# Patient Record
Sex: FEMALE | Race: WHITE | NOT HISPANIC OR LATINO | Employment: FULL TIME | URBAN - METROPOLITAN AREA
[De-identification: names, ages, dates, MRNs, and addresses within clinical notes are randomized per-mention and may not be internally consistent; named-entity substitution may affect disease eponyms.]

---

## 2018-06-29 ENCOUNTER — HOSPITAL ENCOUNTER (EMERGENCY)
Facility: HOSPITAL | Age: 48
Discharge: HOME | End: 2018-06-29
Attending: EMERGENCY MEDICINE | Admitting: EMERGENCY MEDICINE
Payer: COMMERCIAL

## 2018-06-29 ENCOUNTER — APPOINTMENT (EMERGENCY)
Dept: RADIOLOGY | Facility: HOSPITAL | Age: 48
End: 2018-06-29
Payer: COMMERCIAL

## 2018-06-29 VITALS
RESPIRATION RATE: 16 BRPM | SYSTOLIC BLOOD PRESSURE: 125 MMHG | WEIGHT: 149 LBS | HEART RATE: 61 BPM | TEMPERATURE: 98.3 F | OXYGEN SATURATION: 97 % | DIASTOLIC BLOOD PRESSURE: 55 MMHG

## 2018-06-29 DIAGNOSIS — M25.531 WRIST PAIN, ACUTE, RIGHT: Primary | ICD-10-CM

## 2018-06-29 PROCEDURE — 99283 EMERGENCY DEPT VISIT LOW MDM: CPT

## 2018-06-29 PROCEDURE — 73110 X-RAY EXAM OF WRIST: CPT

## 2018-06-29 RX ORDER — FLUOXETINE HYDROCHLORIDE 40 MG/1
40 CAPSULE ORAL DAILY
COMMUNITY

## 2018-06-29 RX ORDER — LEVOTHYROXINE SODIUM 0.07 MG/1
88 TABLET ORAL DAILY
COMMUNITY

## 2018-06-29 RX ORDER — ESTRADIOL 0.5 MG/1
0.5 TABLET ORAL DAILY
COMMUNITY

## 2018-06-29 NOTE — ED PROVIDER NOTES
History  Chief Complaint   Patient presents with    Wrist Injury     patient presents with foosh injury to right wrist that happened monday        History provided by:  Patient   used: No    Wrist Pain   Location:  Right wrist  Quality:  Ache  Severity:  Moderate  Onset quality:  Gradual  Duration:  5 days  Timing:  Constant  Progression:  Waxing and waning  Chronicity:  New  Context:  Fall on outstretched hand  Relieved by:  Rest  Worsened by: Movement, pressure  Ineffective treatments:  None tried  Associated symptoms: no abdominal pain, no chest pain, no cough, no diarrhea, no fatigue, no fever, no headaches, no nausea, no rash, no rhinorrhea, no shortness of breath, no sore throat and no vomiting        Prior to Admission Medications   Prescriptions Last Dose Informant Patient Reported? Taking? FLUoxetine (PROzac) 40 MG capsule   Yes Yes   Sig: Take 40 mg by mouth daily   estradiol (ESTRACE) 0 5 MG tablet   Yes Yes   Sig: Take 0 5 mg by mouth daily   levothyroxine 75 mcg tablet   Yes Yes   Sig: Take 88 mcg by mouth daily      Facility-Administered Medications: None       History reviewed  No pertinent past medical history  History reviewed  No pertinent surgical history  History reviewed  No pertinent family history  I have reviewed and agree with the history as documented  Social History   Substance Use Topics    Smoking status: Current Every Day Smoker     Packs/day: 0 50     Types: Cigarettes    Smokeless tobacco: Never Used    Alcohol use No        Review of Systems   Constitutional: Negative for activity change, appetite change, fatigue, fever and unexpected weight change  HENT: Negative for rhinorrhea, sore throat and voice change  Eyes: Negative for pain and visual disturbance  Respiratory: Negative for cough, chest tightness and shortness of breath  Cardiovascular: Negative for chest pain     Gastrointestinal: Negative for abdominal pain, diarrhea, nausea and vomiting  Endocrine: Negative for polyphagia and polyuria  Genitourinary: Negative for difficulty urinating, dysuria, flank pain, frequency and urgency  Musculoskeletal: Negative for back pain, gait problem, neck pain and neck stiffness  Skin: Negative for color change and rash  Allergic/Immunologic: Negative for immunocompromised state  Neurological: Negative for dizziness, syncope, speech difficulty, light-headedness, numbness and headaches  Hematological: Does not bruise/bleed easily  Psychiatric/Behavioral: Negative for confusion and decreased concentration  Physical Exam  Physical Exam   Constitutional: She is oriented to person, place, and time  She appears well-developed and well-nourished  HENT:   Head: Normocephalic and atraumatic  Eyes: Conjunctivae and EOM are normal  Pupils are equal, round, and reactive to light  No scleral icterus  Neck: Normal range of motion  Neck supple  No tracheal deviation present  Cardiovascular: Normal rate and regular rhythm  Pulmonary/Chest: Effort normal and breath sounds normal  No respiratory distress  Abdominal: Soft  She exhibits no distension  There is no tenderness  There is no rebound and no guarding  Musculoskeletal: Normal range of motion  She exhibits no tenderness or deformity  Right upper extremity  There is anatomical snuffbox tenderness  Range of motion is normal  Normal capillary refill and distal sensation  Patient complains of pain with dorsiflexion and abduction of the wrist against resistance  No edema or ecchymoses  Lymphadenopathy:     She has no cervical adenopathy  Neurological: She is alert and oriented to person, place, and time  No gross focal sensory or motor deficits   Skin: Skin is warm and dry  No rash noted  She is not diaphoretic  Psychiatric: She has a normal mood and affect  Vitals reviewed        Vital Signs  ED Triage Vitals   Temperature Pulse Respirations Blood Pressure SpO2 06/29/18 1016 06/29/18 1016 06/29/18 1016 06/29/18 1016 06/29/18 1016   98 3 °F (36 8 °C) 76 16 128/60 98 %      Temp src Heart Rate Source Patient Position - Orthostatic VS BP Location FiO2 (%)   -- 06/29/18 1107 06/29/18 1107 06/29/18 1107 --    Monitor Sitting Left arm       Pain Score       06/29/18 1016       7           Vitals:    06/29/18 1016 06/29/18 1107   BP: 128/60 125/55   Pulse: 76 61   Patient Position - Orthostatic VS:  Sitting       Visual Acuity      ED Medications  Medications - No data to display    Diagnostic Studies  Results Reviewed     None                 XR wrist 3+ views RIGHT   Final Result by Ashish Ugarte DO (06/29 1136)   No acute osseous abnormality  Workstation performed: [id number]                    Procedures  Procedures       Phone Contacts  ED Phone Contact    ED Course                               MDM  Number of Diagnoses or Management Options  Wrist pain, acute, right: new and requires workup  Diagnosis management comments: 49-year-old female presented for evaluation of right-sided wrist pain  Patient reports a fall on an outstretched hand that occurred five days prior to presentation  She has had persistent discomfort in the lateral aspect of her right wrist   This is worse with movement or with weight-bearing, direct palpation  Denies any numbness or weakness  No head trauma or loss of consciousness  No trauma to other extremities  Plain radiographs are negative for to dislocation does difficult tenderness over the right scaphoid bone  Will treat for possible occult scaphoid fracture the pedis  Discussed reasons to return to the emergency department         Amount and/or Complexity of Data Reviewed  Tests in the radiology section of CPT®: reviewed and ordered  Review and summarize past medical records: yes  Independent visualization of images, tracings, or specimens: yes      CritCare Time    Disposition  Final diagnoses:   Wrist pain, acute, right Time reflects when diagnosis was documented in both MDM as applicable and the Disposition within this note     Time User Action Codes Description Comment    6/29/2018 11:49 AM Vanessalai Nolan Add [N97 [id number]] Wrist pain, acute, right       ED Disposition     ED Disposition Condition Comment    Discharge  Travon Carlson discharge to home/self care  Condition at discharge: Good        Follow-up Information     Follow up With Specialties Details Why [address] [facility]  Please call to arrange for follow-up in 1 to 2 weeks  If you have new or worsening symptoms please call your doctor or return to the emergency department  [address] [facility] [address]  [phone]          Patient's Medications   Discharge Prescriptions    No medications on file     No discharge procedures on file      ED Provider  Electronically Signed by           Kristi Auguste MD  06/29/18 0777

## 2018-06-29 NOTE — DISCHARGE INSTRUCTIONS
Wrist Injury   WHAT YOU NEED TO KNOW:   A wrist injury happens when the tissues of your wrist joint are damaged  Your wrist joint is made up of tendons, ligaments, nerves, and bones  Two common types of injuries that can happen to your wrist are sprains and strains  A sprain can happen when the ligaments are stretched or torn  Ligaments are bands of elastic tissue that connect and hold the bones together  A strain happens when a tendon or muscle is overused, stretched, or torn  Tendons attach your hand and arm muscles to the bones of the wrist    DISCHARGE INSTRUCTIONS:   Medicines:   · NSAIDs:  These medicines decrease swelling, pain, and fever  NSAIDs are available without a doctor's order  Ask which medicine is right for you  Ask how much to take and when to take it  Take as directed  NSAIDs can cause stomach bleeding and kidney problems if not taken correctly  · Pain medicine: You may be given a prescription medicine to decrease pain  Do not wait until the pain is severe before you take this medicine  · Take your medicine as directed  Contact your healthcare provider if you think your medicine is not helping or if you have side effects  Tell him of her if you are allergic to any medicine  Keep a list of the medicines, vitamins, and herbs you take  Include the amounts, and when and why you take them  Bring the list or the pill bottles to follow-up visits  Carry your medicine list with you in case of an emergency  Follow up with your healthcare provider as directed:  Write down your questions so you remember to ask them during your visits  Manage your symptoms:   · Wrist supports:  A cast or splint may be put on your fingers, hand, and wrist to support your wrist and prevent further damage  Wear these as directed  Ask for instructions on how to bathe while you are wearing a splint or case  · Rest:  You may need to rest your wrist for at least 48 hours and avoid activities that cause pain   Ask what activities you should avoid and for how long  · Ice:  Ice helps decrease swelling and pain  Ice may also help prevent tissue damage  Use an ice pack or put crushed ice in a plastic bag  Cover it with a towel and place it on your injured wrist for 15 to 20 minutes every hour as directed  · Compression:  Your healthcare provider may suggest you wrap your wrist with an elastic bandage  This will help decrease swelling, support your wrist, and help it heal  Wear your wrist wrap as directed  Ask for instructions on how to wrap your wrist     · Elevation:  When you sit or lie down, keep your wrist at or above the level of your heart  This may help decrease pain and swelling  Physical therapy:  Your healthcare provider may recommend that you go to physical therapy  A physical therapist shows you how to do exercises that can help to strengthen your wrist and improve its range of movement  These exercises may also help decrease your pain  Prevent another wrist injury:   · Do strengthening exercises: Your healthcare provider or physical therapist may suggest that you do exercises to strengthen your hand and arm muscles  Ask when you may return to your regular physical activities or sports  If you start to exercise too soon it may cause you to injure your wrist again  · Protect your wrists:  Wrist guard splints or protective tape can help to support your wrist during exercise and sports  These devices may also keep your wrist from bending too far back  Ask for more information about the type of wrist support that you should use  Contact your healthcare provider if:   · You have a fever  · The bruising, swelling, or pain in your wrist gets worse  · You have questions or concerns about your condition or care  Return to the emergency department if:   · The skin on or near your wrist or hand feels cold, or it turns blue or white      · The skin on or near your wrist or hand is very tight, raised, and swollen  · You have new trouble moving and using your hands, fingers, or wrist     · Your wrist, hands, or fingers become swollen, red, numb, or they tingle  · Your wrist has any open wounds, including from surgery, that are red, swollen, warm, or have pus coming from them  © 2017 [employer] Information is for End User's use only and may not be sold, redistributed or otherwise used for commercial purposes  All illustrations and images included in CareNotes® are the copyrighted property of A D A M [employer] Inc  or Gregory Cavanaugh  The above information is an  only  It is not intended as medical advice for individual conditions or treatments  Talk to your doctor, nurse or pharmacist before following any medical regimen to see if it is safe and effective for you

## 2018-07-03 ENCOUNTER — OFFICE VISIT (OUTPATIENT)
Dept: OBGYN CLINIC | Facility: CLINIC | Age: 48
End: 2018-07-03
Payer: COMMERCIAL

## 2018-07-03 VITALS
BODY MASS INDEX: 27.68 KG/M2 | RESPIRATION RATE: 12 BRPM | WEIGHT: 150.4 LBS | SYSTOLIC BLOOD PRESSURE: 110 MMHG | HEART RATE: 74 BPM | DIASTOLIC BLOOD PRESSURE: 78 MMHG | HEIGHT: 62 IN

## 2018-07-03 DIAGNOSIS — M25.531 RIGHT WRIST PAIN: Primary | ICD-10-CM

## 2018-07-03 PROCEDURE — 99203 OFFICE O/P NEW LOW 30 MIN: CPT | Performed by: FAMILY MEDICINE

## 2018-07-03 NOTE — LETTER
July 3, 2018     Patient: Markos Moreno   YOB: 1970   Date of Visit: 7/3/2018       To Whom it May Concern:    Markos Moreno is under my professional care  She was seen in my office on 7/3/2018  She is unable to return to work at this time  She is being sent for CT scan of the wrist and will be re-evaluated afterward  If you have any questions or concerns, please don't hesitate to call           Sincerely,          [employer], DO        CC: No Recipients

## 2018-07-03 NOTE — PROGRESS NOTES
Assessment/Plan:  Assessment/Plan   Diagnoses and all orders for this visit:    Right wrist pain  -     CT wrist right wo contrast; Future  -     Brace         31-year-old female with right wrist pain  Discussed with patient physical exam, radiographs, impression, and plan  X-rays are unremarkable for acute osseous abnormality  Her physical exam is remarkable for significant tenderness upon palpation of the scaphoid and significant pain with active and passive range of motion of the thumb  Given mechanism of injury of falling on outstretched hand and significant tenderness of the scaphoid there is high clinical suspicion for occult fracture  I will refer her for CT scan of the right wrist and she will follow up with me after getting this done  I will place her in thumb spica brace which she is to wear at all times except for hygiene  She is to refrain from any heavy lifting, pulling, or pushing in the right upper extremity  Subjective:   Patient ID: Luis Villalba is a 52 y o  female  Chief Complaint   Patient presents with    Right Wrist - Pain, Fracture         31-year-old right-hand-dominant female presents for evaluation of right wrist pain following fall injury at home 1 week ago  While walking up stairs she fell forward on her outstretched hand  She had pain immediately that was described as localized to radial aspect of the wrist, sharp, radiating proximally of the forearm, worse with movement of the wrist and thumb, and associated swelling and bruising  Over the next few days she self-treated with ice and taking ibuprofen  Pain worsened so she presented to the emergency room where x-ray evaluation was unremarkable for gross osseous abnormality but was clinical suspicion for occult osseous injury  She was placed in wrist splint and advised to follow up with orthopedic care  Today she reports improved swelling however still has pain at the wrist with movement of the wrist and thumb    She denies any numbness/tingling  She denies any previous injury to the wrist       Wrist Pain   This is a new problem  The current episode started in the past 7 days  The problem occurs constantly  The problem has been unchanged  Associated symptoms include arthralgias and joint swelling  Pertinent negatives include no chest pain, chills, fever, numbness, rash, sore throat or weakness  Exacerbated by: Wrist and thumb use  She has tried rest, immobilization and ice for the symptoms  The treatment provided mild relief  The following portions of the patient's history were reviewed and updated as appropriate: She  has a past medical history of Anxiety; Depression; Disease of thyroid gland; and Endometriosis  She  has a past surgical history that includes Hysterectomy and Breast mass excision  Her family history includes Anuerysm in her mother; Diabetes in her father; Irving Officer' disease in her brother; Heart disease in her brother, father, and mother; No Known Problems in her sister; Seizures in her brother  She  reports that she has been smoking Cigarettes  She has been smoking about 0 50 packs per day  She has never used smokeless tobacco  She reports that she does not drink alcohol or use drugs  She has No Known Allergies       Review of Systems   Constitutional: Negative for chills and fever  HENT: Negative for sore throat  Eyes: Negative for visual disturbance  Respiratory: Negative for shortness of breath  Cardiovascular: Negative for chest pain  Gastrointestinal: Negative for abdominal distention  Genitourinary: Negative for flank pain  Musculoskeletal: Positive for arthralgias and joint swelling  Skin: Negative for rash and wound  Neurological: Negative for weakness and numbness  Hematological: Does not bruise/bleed easily  Psychiatric/Behavioral: Negative for self-injury         Objective:  Vitals:    07/03/18 1346   BP: 110/78   BP Location: Left arm   Patient Position: Sitting   Cuff Size: Standard   Pulse: 74   Resp: 12   Weight: 68 2 kg (150 lb 6 4 oz)   Height: 5' 2" (1 575 m)     Right Hand Exam     Muscle Strength   Wrist Extension: 4+/5   Wrist Flexion: 4+/5       Right Elbow Exam     Tenderness   The patient is experiencing no tenderness  Range of Motion   The patient has normal right elbow ROM  Muscle Strength   The patient has normal right elbow strength  Observations     Right Wrist/Hand   Negative for deformity  Tenderness     Right Wrist/Hand   Tenderness in the carpometacarpal joint and scaphoid  No tenderness in the first dorsal compartment and triangular fibrocartilage complex   Active Range of Motion     Right Wrist   Wrist flexion: 20 degrees with pain  Wrist extension: 30 degrees with pain    Additional Active Range of Motion Details  Right  - normal range of motion of 2nd through 5th digits  - limited range of motion of thumb at [facility] due to pain    Strength/Myotome Testing     Right Wrist/Hand   Wrist extension: 4+  Wrist flexion: 4+     (2nd hand position)     Trial 1: 5    Tests     Right Wrist/Hand   Negative TFCC load  Physical Exam   Constitutional: She is oriented to person, place, and time  She appears well-developed  No distress  HENT:   Head: Normocephalic  Eyes: Conjunctivae are normal    Neck: No tracheal deviation present  Cardiovascular: Normal rate  Pulmonary/Chest: Effort normal  No respiratory distress  Abdominal: She exhibits no distension  Musculoskeletal:        Right hand: She exhibits no deformity  Neurological: She is alert and oriented to person, place, and time  Skin: Skin is warm and dry  Psychiatric: She has a normal mood and affect  Her behavior is normal    Nursing note and vitals reviewed        I have personally reviewed pertinent films in PACS and my interpretation is No acute osseous abnormality of right wrist

## 2018-07-10 ENCOUNTER — HOSPITAL ENCOUNTER (OUTPATIENT)
Dept: CT IMAGING | Facility: HOSPITAL | Age: 48
Discharge: HOME | End: 2018-07-10
Payer: COMMERCIAL

## 2018-07-10 DIAGNOSIS — M25.531 RIGHT WRIST PAIN: ICD-10-CM

## 2018-07-10 PROCEDURE — 73200 CT UPPER EXTREMITY W/O DYE: CPT

## 2018-07-12 ENCOUNTER — OFFICE VISIT (OUTPATIENT)
Dept: OBGYN CLINIC | Facility: CLINIC | Age: 48
End: 2018-07-12
Payer: COMMERCIAL

## 2018-07-12 VITALS
HEIGHT: 62 IN | SYSTOLIC BLOOD PRESSURE: 122 MMHG | HEART RATE: 68 BPM | WEIGHT: 153 LBS | BODY MASS INDEX: 28.16 KG/M2 | DIASTOLIC BLOOD PRESSURE: 84 MMHG

## 2018-07-12 DIAGNOSIS — S69.91XD WRIST INJURY, RIGHT, SUBSEQUENT ENCOUNTER: ICD-10-CM

## 2018-07-12 DIAGNOSIS — M65.4 DE QUERVAIN'S TENOSYNOVITIS, RIGHT: Primary | ICD-10-CM

## 2018-07-12 DIAGNOSIS — M25.531 PAIN IN RIGHT WRIST: ICD-10-CM

## 2018-07-12 PROCEDURE — 20550 NJX 1 TENDON SHEATH/LIGAMENT: CPT | Performed by: FAMILY MEDICINE

## 2018-07-12 PROCEDURE — 99213 OFFICE O/P EST LOW 20 MIN: CPT | Performed by: FAMILY MEDICINE

## 2018-07-12 RX ORDER — LIDOCAINE HYDROCHLORIDE 10 MG/ML
0.5 INJECTION, SOLUTION INFILTRATION; PERINEURAL
Status: COMPLETED | OUTPATIENT
Start: 2018-07-12 | End: 2018-07-12

## 2018-07-12 RX ORDER — NAPROXEN 500 MG/1
500 TABLET ORAL 2 TIMES DAILY WITH MEALS
Qty: 30 TABLET | Refills: 0 | Status: SHIPPED | OUTPATIENT
Start: 2018-07-12 | End: 2018-07-29 | Stop reason: SDUPTHER

## 2018-07-12 RX ORDER — TRIAMCINOLONE ACETONIDE 40 MG/ML
20 INJECTION, SUSPENSION INTRA-ARTICULAR; INTRAMUSCULAR
Status: COMPLETED | OUTPATIENT
Start: 2018-07-12 | End: 2018-07-12

## 2018-07-12 RX ADMIN — TRIAMCINOLONE ACETONIDE 20 MG: 40 INJECTION, SUSPENSION INTRA-ARTICULAR; INTRAMUSCULAR at 12:38

## 2018-07-12 RX ADMIN — LIDOCAINE HYDROCHLORIDE 0.5 ML: 10 INJECTION, SOLUTION INFILTRATION; PERINEURAL at 12:38

## 2018-07-12 NOTE — LETTER
July 12, 2018     Patient: Ximena Negron   YOB: 1970   Date of Visit: 7/12/2018       To Whom it May Concern:    Ximena Negron is under my professional care  She was seen in my office on 7/12/2018  She is unable to return to work at this time  She will be re-evaluated in 3 weeks  If you have any questions or concerns, please don't hesitate to call           Sincerely,          [employer], DO        CC: No Recipients

## 2018-07-12 NOTE — PROGRESS NOTES
Assessment/Plan:  Assessment/Plan   Diagnoses and all orders for this visit:    De Quervain's tenosynovitis, right  -     Hand/upper extremity injection  -     naproxen (NAPROSYN) 500 mg tablet; Take 1 tablet (500 mg total) by mouth 2 (two) times a day with meals    Wrist injury, right, subsequent encounter    Pain in right wrist  -     Hand/upper extremity injection  -     naproxen (NAPROSYN) 500 mg tablet; Take 1 tablet (500 mg total) by mouth 2 (two) times a day with meals  -     diclofenac sodium (VOLTAREN) 1 %; Apply 2 g topically 4 (four) times a day    Other orders  -     Cancel: Medium joint arthrocentesis        44-year-old right-hand-dominant female with right wrist pain  Discussed with patient physical exam, CT scan, impression, and plan  CT of wrist is unremarkable for acute osseous abnormality of the wrist  Physical exam is still noted for tenderness at the distal aspect of the radius, snuffbox, and 1st dorsal compartment, and pain with Finkelstein's  Clinical impression is contusion of the wrist with de Quervain tenosynovitis  I discussed treatment regimen of oral anti-inflammatory and corticosteroid injection to which she agreed  I administered mixture of 0 5 cc 1% lidocaine and 0 5 cc Kenalog to the 1st dorsal compartment without complication  She is to continue wearing thumb spica brace  She is to take naproxen 500 mg twice daily with food consistently for 2 weeks  She will follow up with me in 3 weeks at which point she will be re-evaluated  Subjective:   Patient ID: Cassidy Person is a 52 y o  female  Chief Complaint   Patient presents with    Right Wrist - Follow-up         44-year-old right-hand-dominant female following up for right wrist pain from fall injury 2 weeks ago  She was last seen 9 days ago at which point she was noted to have significant tenderness upon palpation of the scaphoid and was sent for CT of the wrist to evaluate for occult osseous injury    She has been wearing thumb spica brace at all times except for hygiene purposes  She still reports having significant pain at the radial aspect of the wrist that is described as constant, achy and sometimes sharp, nonradiating, worse with movement of the thumb and wrist, and improved with immobilization  She has not had any new injury since her last visit  Wrist Pain   This is a new problem  The current episode started 1 to 4 weeks ago  The problem occurs constantly  The problem has been unchanged  Associated symptoms include arthralgias  Pertinent negatives include no joint swelling, numbness or weakness  Exacerbated by: Wrist and thumb movement  She has tried immobilization for the symptoms  The treatment provided mild relief  Review of Systems   Musculoskeletal: Positive for arthralgias  Negative for joint swelling  Neurological: Negative for weakness and numbness  Objective:  Vitals:    07/12/18 1112   BP: 122/84   Pulse: 68   Weight: 69 4 kg (153 lb)   Height: 5' 2" (1 575 m)     Right Hand Exam     Muscle Strength   Wrist Extension: 4+/5   Wrist Flexion: 4+/5     Tests   Finkelstein: positive          Observations     Right Wrist/Hand   Negative for deformity  Tenderness     Right Wrist/Hand   Tenderness in the first dorsal compartment and scaphoid  No tenderness in the carpometacarpal joint  Active Range of Motion     Right Wrist   Wrist flexion: 50 degrees with pain  Wrist extension: 60 degrees with pain  Ulnar deviation: 10 degrees with pain    Additional Active Range of Motion Details  Right  -normal range of motion of 2nd through 5th digits  -limited range of motion of thumb at the MCP joint due to pain    Strength/Myotome Testing     Right Wrist/Hand   Wrist extension: 4+  Wrist flexion: 4+     (2nd hand position)     Trial 1: 5    Tests     Right Wrist/Hand   Positive Finkelstein's  Physical Exam   Constitutional: She is oriented to person, place, and time   She appears well-developed  No distress  HENT:   Head: Normocephalic  Eyes: Conjunctivae are normal    Neck: No tracheal deviation present  Cardiovascular: Normal rate  Pulmonary/Chest: Effort normal  No respiratory distress  Abdominal: She exhibits no distension  Musculoskeletal:        Right hand: She exhibits no deformity  Neurological: She is alert and oriented to person, place, and time  Skin: Skin is warm and dry  Psychiatric: She has a normal mood and affect  Her behavior is normal    Nursing note and vitals reviewed        I have personally reviewed pertinent films in PACS and my interpretation is No acute osseous abnormality of right wrist     Hand/upper extremity injection  Date/Time: 7/12/2018 12:38 PM  Consent given by: patient  Site marked: site marked  Timeout: Immediately prior to procedure a time out was called to verify the correct patient, procedure, equipment, support staff and site/side marked as required   Supporting Documentation  Indications: tendon swelling   Procedure Details  Condition:de Quervain's tenosynovitis Site: R extensor compartment 1   Preparation: Patient was prepped and draped in the usual sterile fashion  Needle size: 25 G  Ultrasound guidance: no  Approach: radial  Medications administered: 0 5 mL lidocaine 1 %; 20 mg triamcinolone acetonide 40 mg/mL  Patient tolerance: patient tolerated the procedure well with no immediate complications  Dressing:  Sterile dressing applied

## 2018-07-29 DIAGNOSIS — M65.4 DE QUERVAIN'S TENOSYNOVITIS, RIGHT: ICD-10-CM

## 2018-07-29 DIAGNOSIS — M25.531 PAIN IN RIGHT WRIST: ICD-10-CM

## 2018-07-30 RX ORDER — NAPROXEN 500 MG/1
TABLET ORAL
Qty: 30 TABLET | Refills: 0 | Status: SHIPPED | OUTPATIENT
Start: 2018-07-30 | End: 2018-08-08

## 2018-08-08 ENCOUNTER — OFFICE VISIT (OUTPATIENT)
Dept: OBGYN CLINIC | Facility: CLINIC | Age: 48
End: 2018-08-08
Payer: COMMERCIAL

## 2018-08-08 VITALS — WEIGHT: 154.6 LBS | RESPIRATION RATE: 16 BRPM | BODY MASS INDEX: 28.45 KG/M2 | HEIGHT: 62 IN

## 2018-08-08 DIAGNOSIS — M19.031 ARTHRITIS OF RIGHT WRIST: ICD-10-CM

## 2018-08-08 DIAGNOSIS — M25.531 PAIN IN RIGHT WRIST: Primary | ICD-10-CM

## 2018-08-08 PROCEDURE — 99214 OFFICE O/P EST MOD 30 MIN: CPT | Performed by: FAMILY MEDICINE

## 2018-08-08 RX ORDER — MELOXICAM 15 MG/1
15 TABLET ORAL DAILY
Qty: 30 TABLET | Refills: 1 | Status: SHIPPED | OUTPATIENT
Start: 2018-08-08 | End: 2019-06-24

## 2018-08-08 RX ORDER — TRAMADOL HYDROCHLORIDE 50 MG/1
50 TABLET ORAL EVERY 8 HOURS PRN
Qty: 30 TABLET | Refills: 0 | Status: SHIPPED | OUTPATIENT
Start: 2018-08-08 | End: 2018-10-29

## 2018-08-08 RX ORDER — VARENICLINE TARTRATE 25 MG
KIT ORAL 2 TIMES DAILY
COMMUNITY
End: 2018-10-01

## 2018-08-08 NOTE — LETTER
August 8, 2018     Patient: Adonis Maloney   YOB: 1970   Date of Visit: 8/8/2018       To Whom it May Concern:    Adonis Maloney is under my professional care  She was seen in my office on 8/8/2018  She is unable to return to work at this time, for 2 weeks  She is being sent for MRI and will be re-evaluated  If you have any questions or concerns, please don't hesitate to call           Sincerely,          [employer], DO        CC: No Recipients

## 2018-08-08 NOTE — PROGRESS NOTES
Assessment/Plan:  Assessment/Plan   Diagnoses and all orders for this visit:    Pain in right wrist  -     traMADol (ULTRAM) 50 mg tablet; Take 1 tablet (50 mg total) by mouth every 8 (eight) hours as needed for moderate pain  -     MRI wrist right wo contrast; Future    Arthritis of right wrist  -     meloxicam (MOBIC) 15 mg tablet; Take 1 tablet (15 mg total) by mouth daily  -     MRI wrist right wo contrast; Future    Other orders  -     varenicline (CHANTIX ASHLEE) 0 5 MG X 11 & 1 MG X 42 tablet; Take by mouth 2 (two) times a day Take one 0 5mg tab by mouth 1x daily for 3 days, then increase to one 0 5mg tab 2x daily for 3 days, then increase to one 1mg tab 2x daily    77-year-old right-hand-dominant female with persistent right wrist pain after fall injury  Discussed with patient physical exam, impression, and plan  Physical exam is still noted for tenderness of the scaphoid and carpometacarpal joint of the 2nd through 3rd digits  She has not improved after corticosteroid injection,  Wrist brace, and oral NSAIDs  I will refer her for MRI of the right wrist to evaluate for occult osseous and soft tissue abnormality  She is to continue with wearing thumb spica brace  She will follow up with me after getting MRI done  Subjective:   Patient ID: Ximena Negron is a 52 y o  female  Chief Complaint   Patient presents with    Right Wrist - Follow-up         77-year-old right-hand-dominant female following up for right wrist pain after fall injury  She was last seen 4 weeks ago at which point she received corticosteroid injection for treatment de Quervain tenosynovitis, was to continue with thumb spica brace, and prescribed naproxen  Today she reports she has not had improvement in pain  Pain described as localized to the wrist and base of the thumb, sharp, worse with twisting move the thumb, and improved with neutral position  She has also been using topical Voltaren gel    She denies any new injury since last visit  Wrist Pain   This is a new problem  The current episode started more than 1 month ago  The problem occurs constantly  The problem has been unchanged  Associated symptoms include arthralgias and joint swelling  Pertinent negatives include no numbness or weakness  Exacerbated by: Wrist use  She has tried rest, NSAIDs and immobilization (Corticosteroid injection) for the symptoms  The treatment provided no relief  Review of Systems   Musculoskeletal: Positive for arthralgias and joint swelling  Neurological: Negative for weakness and numbness  Objective:  Vitals:    08/08/18 1423   Resp: 16   Weight: 70 1 kg (154 lb 9 6 oz)   Height: 5' 2" (1 575 m)     Right Hand Exam     Muscle Strength   The patient has normal right wrist strength  Observations     Right Wrist/Hand   Negative for deformity  Tenderness     Right Wrist/Hand   Tenderness in the first dorsal compartment, carpometacarpal joint and scaphoid  Additional Tenderness Details   Right  -base of 2nd and 3rd metacarpals    Active Range of Motion     Right Wrist   Normal active range of motion  Wrist flexion: with pain  Wrist extension: with pain  Radial deviation: with pain  Ulnar deviation: with pain    Additional Active Range of Motion Details   Right   - normal range of motion of fingers    Strength/Myotome Testing     Right Wrist/Hand   Normal wrist strength      Physical Exam   Constitutional: She is oriented to person, place, and time  She appears well-developed and well-nourished  No distress  HENT:   Head: Normocephalic  Eyes: Conjunctivae are normal    Neck: No tracheal deviation present  Cardiovascular: Normal rate  Pulmonary/Chest: Effort normal  No respiratory distress  Abdominal: She exhibits no distension  Musculoskeletal: Normal range of motion  She exhibits tenderness  She exhibits no edema or deformity  Right hand: She exhibits no deformity     Neurological: She is alert and oriented to person, place, and time  She exhibits normal muscle tone  Coordination normal    Skin: Skin is warm and dry  No rash noted  She is not diaphoretic  No erythema  No pallor  Psychiatric: She has a normal mood and affect  Her behavior is normal  Judgment and thought content normal    Nursing note and vitals reviewed

## 2018-08-08 NOTE — LETTER
August 8, 2018     Patient: Jah Escamilal   YOB: 1970   Date of Visit: 8/8/2018       To Whom it May Concern:    Jah Escamilla is under my professional care  She was seen in my office on 8/8/2018  She is unable to return to work at this time  She is being sent for MRI  If you have any questions or concerns, please don't hesitate to call           Sincerely,          [employer], DO        CC: No Recipients

## 2018-08-20 ENCOUNTER — HOSPITAL ENCOUNTER (OUTPATIENT)
Dept: MRI IMAGING | Facility: HOSPITAL | Age: 48
Discharge: HOME | End: 2018-08-20
Payer: COMMERCIAL

## 2018-08-20 DIAGNOSIS — M25.531 PAIN IN RIGHT WRIST: ICD-10-CM

## 2018-08-20 DIAGNOSIS — M19.031 ARTHRITIS OF RIGHT WRIST: ICD-10-CM

## 2018-08-20 PROCEDURE — 73221 MRI JOINT UPR EXTREM W/O DYE: CPT

## 2018-08-23 ENCOUNTER — OFFICE VISIT (OUTPATIENT)
Dept: OBGYN CLINIC | Facility: CLINIC | Age: 48
End: 2018-08-23
Payer: COMMERCIAL

## 2018-08-23 VITALS
WEIGHT: 155 LBS | DIASTOLIC BLOOD PRESSURE: 87 MMHG | SYSTOLIC BLOOD PRESSURE: 131 MMHG | HEART RATE: 73 BPM | BODY MASS INDEX: 28.52 KG/M2 | HEIGHT: 62 IN

## 2018-08-23 DIAGNOSIS — M18.11 ARTHRITIS OF CARPOMETACARPAL (CMC) JOINT OF RIGHT THUMB: ICD-10-CM

## 2018-08-23 DIAGNOSIS — M19.031 ARTHRITIS OF RIGHT WRIST: ICD-10-CM

## 2018-08-23 DIAGNOSIS — S63.501D SPRAIN OF RIGHT WRIST, SUBSEQUENT ENCOUNTER: Primary | ICD-10-CM

## 2018-08-23 PROCEDURE — 99213 OFFICE O/P EST LOW 20 MIN: CPT | Performed by: FAMILY MEDICINE

## 2018-08-23 NOTE — LETTER
August 23, 2018     Patient: Vonna Lanes   YOB: 1970   Date of Visit: 8/23/2018       To Whom it May Concern:    Vonna Lanes is under my professional care  She was seen in my office on 8/23/2018  She is unable to return to work at this time  She will be re-evaluated in 2 weeks  If you have any questions or concerns, please don't hesitate to call           Sincerely,          [employer], DO        CC: No Recipients

## 2018-08-23 NOTE — PROGRESS NOTES
Assessment/Plan:  Assessment/Plan   Diagnoses and all orders for this visit:    Sprain of right wrist, subsequent encounter    Arthritis of carpometacarpal [place]) joint of right thumb    Arthritis of right wrist        69-year-old female with right wrist pain after fall injury at home on 06/25/2018  Discussed with patient physical exam, MRI findings, impression, and plan  MRI is noted for chondromalacia in the proximal radial aspect of the lunate bone, cystic changes seen in distal aspect the trapezius bone, negative for fracture of the scaphoid  Physical exam is noted for tenderness of the thumb CMC joint, and radiocarpal and ulnocarpal joints, with reproduction of pain at the radiocarpal joint with resisted wrist extension and resisted wrist flexion, and reproducible CMC joint of the thumb pain with resisted abduction, abduction, flexion, and extension  I discussed with patient that she has had underlying arthritis of the wrist and fall injury caused exacerbation  She is to continue with taking meloxicam 15 mg once daily, Voltaren gel, and wearing thumb spica brace intermittently as needed  She will follow up with me in 2 weeks at which point she will be re-evaluated and possibly perform corticosteroid injection  Subjective:   Patient ID: Ava Musa is a 52 y o  female  Chief Complaint   Patient presents with    Right Wrist - Pain, Follow-up      69-year-old right-hand-dominant female following up for right wrist pain sustained from fall injury at home on 06/25/2018  She was last seen 2 weeks ago at which point she was referred for MRI of the wrist, as she had not improved after receiving corticosteroid injection for treatment of de Quervain tenosynovitis  Today she reports she has been taking meloxicam 15 mg once daily and using topical Voltaren gel twice a day    She reports improvement and pain but still has pain that is described as localized to the dorsal radial aspect of the wrist and base of the thumb, constant, achy, intermittently sharp, worse with gripping activities, and improved with immobilization in thumb spica brace  She has not had any new injury since last visit  Wrist Pain   This is a new problem  The current episode started more than 1 month ago  The problem occurs constantly  The problem has been gradually improving  Associated symptoms include arthralgias and joint swelling  Pertinent negatives include no numbness or weakness  The symptoms are aggravated by twisting (Gripping)  She has tried rest, immobilization and NSAIDs for the symptoms  The treatment provided mild relief  Review of Systems   Musculoskeletal: Positive for arthralgias and joint swelling  Neurological: Negative for weakness and numbness  Objective:  Vitals:    08/23/18 1058   BP: 131/87   Pulse: 73   Weight: 70 3 kg (155 lb)   Height: 5' 2" (1 575 m)     Right Hand Exam     Muscle Strength   Wrist Extension: 4+/5   Wrist Flexion: 4+/5           Observations     Right Wrist/Hand   Negative for deformity  Tenderness     Right Wrist/Hand   Tenderness in the first dorsal compartment and carpometacarpal joint  Additional Tenderness Details  Right wrist  -radiocarpal joint  -ulnocarpal joint    Active Range of Motion     Right Wrist   Wrist flexion: 15 degrees with pain  Wrist extension: 60 degrees with pain    Additional Active Range of Motion Details   Right hand   - normal range of motion of fingers    Strength/Myotome Testing     Right Wrist/Hand   Wrist extension: 4+  Wrist flexion: 4+    Tests     Right Wrist/Hand   Positive CMC grind  Physical Exam   Constitutional: She is oriented to person, place, and time  She appears well-developed  No distress  HENT:   Head: Normocephalic  Eyes: Conjunctivae are normal    Neck: No tracheal deviation present  Cardiovascular: Normal rate  Pulmonary/Chest: Effort normal  No respiratory distress  Abdominal: She exhibits no distension  Musculoskeletal:        Right hand: She exhibits no deformity  Neurological: She is alert and oriented to person, place, and time  Skin: Skin is warm and dry  Psychiatric: She has a normal mood and affect  Her behavior is normal    Nursing note and vitals reviewed  I have personally reviewed pertinent films in PACS and my interpretation is Right wrist CMC joint of the thumb and radiocarpal arthritis

## 2018-09-10 ENCOUNTER — OFFICE VISIT (OUTPATIENT)
Dept: OBGYN CLINIC | Facility: CLINIC | Age: 48
End: 2018-09-10
Payer: COMMERCIAL

## 2018-09-10 VITALS
WEIGHT: 158 LBS | SYSTOLIC BLOOD PRESSURE: 130 MMHG | HEIGHT: 62 IN | BODY MASS INDEX: 29.08 KG/M2 | HEART RATE: 72 BPM | DIASTOLIC BLOOD PRESSURE: 83 MMHG

## 2018-09-10 DIAGNOSIS — M19.031 ARTHRITIS OF WRIST, RIGHT: ICD-10-CM

## 2018-09-10 DIAGNOSIS — M19.049 CMC ARTHRITIS: ICD-10-CM

## 2018-09-10 DIAGNOSIS — S63.501D SPRAIN OF RIGHT WRIST, SUBSEQUENT ENCOUNTER: Primary | ICD-10-CM

## 2018-09-10 PROCEDURE — 20600 DRAIN/INJ JOINT/BURSA W/O US: CPT | Performed by: FAMILY MEDICINE

## 2018-09-10 PROCEDURE — 99213 OFFICE O/P EST LOW 20 MIN: CPT | Performed by: FAMILY MEDICINE

## 2018-09-10 RX ORDER — IBUPROFEN 400 MG/1
400 TABLET ORAL EVERY 4 HOURS
COMMUNITY

## 2018-09-10 RX ORDER — LIDOCAINE HYDROCHLORIDE 10 MG/ML
2.5 INJECTION, SOLUTION INFILTRATION; PERINEURAL
Status: COMPLETED | OUTPATIENT
Start: 2018-09-10 | End: 2018-09-10

## 2018-09-10 RX ORDER — TRIAMCINOLONE ACETONIDE 40 MG/ML
40 INJECTION, SUSPENSION INTRA-ARTICULAR; INTRAMUSCULAR
Status: COMPLETED | OUTPATIENT
Start: 2018-09-10 | End: 2018-09-10

## 2018-09-10 RX ORDER — NAPROXEN 500 MG/1
TABLET ORAL
COMMUNITY
End: 2018-09-10

## 2018-09-10 RX ADMIN — LIDOCAINE HYDROCHLORIDE 2.5 ML: 10 INJECTION, SOLUTION INFILTRATION; PERINEURAL at 11:44

## 2018-09-10 RX ADMIN — TRIAMCINOLONE ACETONIDE 40 MG: 40 INJECTION, SUSPENSION INTRA-ARTICULAR; INTRAMUSCULAR at 11:44

## 2018-09-10 NOTE — PROGRESS NOTES
Assessment/Plan:  Assessment/Plan   Diagnoses and all orders for this visit:    Sprain of right wrist, subsequent encounter    CMC arthritis  -     Small joint arthrocentesis    Arthritis of wrist, right  -     Small joint arthrocentesis    Other orders  -     ibuprofen (MOTRIN) 400 mg tablet; Take 400 mg by mouth every 4 (four) hours  -     Discontinue: naproxen (NAPROSYN) 500 mg tablet; 1 tablet in the morning and 1 tablet at night  -     Cancel: Hand/upper extremity injection        70-year-old right-hand-dominant female with right wrist pain after fall injury at home on 06/25/2018  Discussed with patient physical exam, impression and plan  Physical exam is noted for tenderness of the [facility] joint of the thumb and 2nd digit  I discussed treatment option of corticosteroid injection to which she agreed  I administered mixture of 1 cc 1% lidocaine and 1 cc Kenalog dispersed amongst the [facility] joint of the 1st and 2nd digits of the right hand without complication  She is advised to start taking tumeric supplement once daily and continue with meloxicam 15 mg once daily with food  She may wear thumb spica brace intermittently has needed  She will follow up with me in 3 weeks at which point she will be re-evaluated  Subjective:   Patient ID: Cassidy Person is a 52 y o  female  Chief Complaint   Patient presents with    Right Wrist - Follow-up, Pain, Swelling       70-year-old right-hand-dominant female following up for right wrist pain after fall injury at home on 06/25/2018  She was last seen 2 and half weeks ago at which point MRI reviewed with her was noted for findings consistent with sprain of the wrist and CMC arthritis of the 2nd digit  She was advised on continue with thumb spica brace, Voltaren gel, and meloxicam 15 mg once daily  Today she reports no improvement in pain since her last visit    Pain described as localized to the base of the thenar eminence, constant, achy and intermittently sharp, intermittently radiates distally to the tip of the thumb, and worse with gripping  She has been wearing thumb spica brace intermittently and taking medications as directed  She has not had any new injury since last visit  Wrist Pain   This is a new problem  The current episode started more than 1 month ago  The problem occurs constantly  The problem has been gradually improving  Associated symptoms include arthralgias  Pertinent negatives include no joint swelling, numbness or weakness  Exacerbated by: Gripping  She has tried rest, NSAIDs and immobilization for the symptoms  The treatment provided mild relief  The following portions of the patient's history were reviewed and updated as appropriate: She  has a past medical history of Anxiety; Depression; Disease of thyroid gland; and Endometriosis  She  has a past surgical history that includes Hysterectomy and Breast mass excision  Her family history includes Anuerysm in her mother; Diabetes in her father; Gailen Alcorn' disease in her brother; Heart disease in her brother, father, and mother; No Known Problems in her sister; Seizures in her brother  She  reports that she has been smoking Cigarettes  She has been smoking about 0 50 packs per day  She has never used smokeless tobacco  She reports that she does not drink alcohol or use drugs  She has No Known Allergies       Review of Systems   Musculoskeletal: Positive for arthralgias  Negative for joint swelling  Neurological: Negative for weakness and numbness  Objective:  Vitals:    09/10/18 1045   BP: 130/83   Pulse: 72   Weight: 71 7 kg (158 lb)   Height: 5' 2" (1 575 m)         Observations     Right Wrist/Hand   Negative for deformity  Tenderness     Right Wrist/Hand   Tenderness in the first dorsal compartment, carpometacarpal joint (First and 2nd digit), scaphoid and lunate       Active Range of Motion     Right Wrist   Wrist flexion: 20 degrees with pain  Wrist extension: 60 degrees with pain    Additional Active Range of Motion Details  Right hand  -normal range of motion of fingers      Physical Exam   Constitutional: She is oriented to person, place, and time  She appears well-developed  No distress  HENT:   Head: Normocephalic  Eyes: Conjunctivae are normal    Neck: No tracheal deviation present  Cardiovascular: Normal rate  Pulmonary/Chest: Effort normal  No respiratory distress  Abdominal: She exhibits no distension  Musculoskeletal:        Right hand: She exhibits no deformity  Neurological: She is alert and oriented to person, place, and time  Skin: Skin is warm and dry  Psychiatric: She has a normal mood and affect  Her behavior is normal    Nursing note and vitals reviewed            Small joint arthrocentesis  Date/Time: 9/10/2018 11:44 AM  Consent given by: patient  Site marked: site marked  Timeout: Immediately prior to procedure a time out was called to verify the correct patient, procedure, equipment, support staff and site/side marked as required   Supporting Documentation  Indications: pain   Procedure Details  Location: thumb - R thumb CMC  Preparation: Patient was prepped and draped in the usual sterile fashion  Needle size: 25 G  Ultrasound guidance: no  Approach: dorsal  Medications administered: 2 5 mL lidocaine 1 %; 40 mg triamcinolone acetonide 40 mg/mL    Patient tolerance: patient tolerated the procedure well with no immediate complications  Dressing:  Sterile dressing applied

## 2018-09-10 NOTE — LETTER
September 10, 2018     Patient: Luis Villalba   YOB: 1970   Date of Visit: 9/10/2018       To Whom it May Concern:    Luis Villalba is under my professional care  She was seen in my office on 9/10/2018  She is unable to return to work at this time  She will be re-evaluated in 3 weeks  If you have any questions or concerns, please don't hesitate to call           Sincerely,          [facility], DO        CC: No Recipients

## 2018-10-01 ENCOUNTER — OFFICE VISIT (OUTPATIENT)
Dept: OBGYN CLINIC | Facility: CLINIC | Age: 48
End: 2018-10-01
Payer: COMMERCIAL

## 2018-10-01 VITALS
SYSTOLIC BLOOD PRESSURE: 149 MMHG | HEART RATE: 73 BPM | RESPIRATION RATE: 18 BRPM | HEIGHT: 62 IN | BODY MASS INDEX: 28.74 KG/M2 | WEIGHT: 156.2 LBS | DIASTOLIC BLOOD PRESSURE: 89 MMHG

## 2018-10-01 DIAGNOSIS — M65.4 DE QUERVAIN'S TENOSYNOVITIS, RIGHT: Primary | ICD-10-CM

## 2018-10-01 DIAGNOSIS — M19.031 ARTHRITIS OF WRIST, RIGHT: ICD-10-CM

## 2018-10-01 DIAGNOSIS — M19.049 CMC ARTHRITIS: ICD-10-CM

## 2018-10-01 DIAGNOSIS — S63.501D SPRAIN OF RIGHT WRIST, SUBSEQUENT ENCOUNTER: ICD-10-CM

## 2018-10-01 PROCEDURE — 99213 OFFICE O/P EST LOW 20 MIN: CPT | Performed by: FAMILY MEDICINE

## 2018-10-01 NOTE — LETTER
October 1, 2018     Patient: Kyleigh Darling   YOB: 1970   Date of Visit: 10/1/2018       To Whom it May Concern:    Kyleigh Darling is under my professional care  She was seen in my office on 10/1/2018  She is unable to return to work at this time  She will be re-evaluated in 4 weeks  If you have any questions or concerns, please don't hesitate to call           Sincerely,          [employer], DO        CC: No Recipients

## 2018-10-01 NOTE — PROGRESS NOTES
Assessment/Plan:  Assessment/Plan   Diagnoses and all orders for this visit:    De Quervain's tenosynovitis, right  -     Ambulatory referral to Occupational Therapy; Future    CMC arthritis  -     Ambulatory referral to Occupational Therapy; Future  -     CMC brace    Arthritis of wrist, right  -     Ambulatory referral to Occupational Therapy; Future    Sprain of right wrist, subsequent encounter  -     Ambulatory referral to Occupational Therapy; Future      42-year-old right-hand-dominant female status post right wrist injury from fall at home on 06/25/2018  Discussed with patient physical exam, impression, and plan  Her physical exam is noted for tenderness of the right thumb CMC joint, 2nd digit CMC joint, radiocarpal joint, and 1st dorsal compartment, and there is reproduction of pain with Finkelstein's maneuver  At this time I will refer her to occupational therapy which she is to start as soon as possible and will transition her to a Aia 16 brace  She is to continue with taking meloxicam 15 mg once daily hand increase tumeric to twice daily  She will follow up in 4 weeks at which point she will be re-evaluated  Subjective:   Patient ID: Kyleigh Darling is a 52 y o  female  Chief Complaint   Patient presents with    Right Wrist - Pain       42-year-old right-hand-dominant female following up for right wrist pain after fall injury at home on 06/25/2018  She was last seen 3 weeks ago which point she received corticosteroid injection to the Aia 16 joints of the 1st and 2nd digits of the right hand, and was advised to continue with meloxicam and tumeric supplement  Today she reports improvement in her pain since her last visit  Pain is described as a constant dull ache at the base of the thumb, nonradiating, mild in intensity, and worse with gripping activities    She reports that whenever she  she experiences a sharp and shooting pain that radiates to the distal aspect of the thumb, and is relieved when she relaxes her hand  She has been sleeping in thumb spica brace, and states that when she does not sleep in thumb spica brace she wakes up with more intense pain  She has not had any new injury since her last visit  Wrist Pain   This is a new problem  The current episode started more than 1 month ago  The problem occurs constantly  The problem has been gradually improving  Associated symptoms include arthralgias and joint swelling  Pertinent negatives include no numbness or weakness  Exacerbated by: Gripping  She has tried rest, immobilization and NSAIDs (Corticosteroid injection) for the symptoms  The treatment provided mild relief  Review of Systems   Musculoskeletal: Positive for arthralgias and joint swelling  Neurological: Negative for weakness and numbness  Objective:  Vitals:    10/01/18 0919   BP: 149/89   Pulse: 73   Resp: 18   Weight: 70 9 kg (156 lb 3 2 oz)   Height: 5' 2" (1 575 m)     Right Hand Exam     Muscle Strength   Wrist Extension: 4+/5   Wrist Flexion: 4+/5     Tests   Finkelstein: positive          Observations     Right Wrist/Hand   Negative for deformity  Tenderness     Right Wrist/Hand   Tenderness in the first dorsal compartment and carpometacarpal joint  No tenderness in the scaphoid  Active Range of Motion     Right Wrist   Wrist flexion: 20 degrees with pain  Wrist extension: 60 degrees with pain    Additional Active Range of Motion Details  Right hand  -normal range of motion fingers    Strength/Myotome Testing     Right Wrist/Hand   Wrist extension: 4+  Wrist flexion: 4+    Tests     Right Wrist/Hand   Positive Finkelstein's  Physical Exam   Constitutional: She is oriented to person, place, and time  She appears well-developed  No distress  HENT:   Head: Normocephalic  Eyes: Conjunctivae are normal    Neck: No tracheal deviation present  Cardiovascular: Normal rate  Pulmonary/Chest: Effort normal  No respiratory distress     Abdominal: She exhibits no distension  Musculoskeletal:        Right hand: She exhibits no deformity  Neurological: She is alert and oriented to person, place, and time  Skin: Skin is warm and dry  Psychiatric: She has a normal mood and affect  Her behavior is normal    Nursing note and vitals reviewed

## 2018-10-09 ENCOUNTER — EVALUATION (OUTPATIENT)
Dept: OCCUPATIONAL THERAPY | Facility: CLINIC | Age: 48
End: 2018-10-09
Payer: COMMERCIAL

## 2018-10-09 DIAGNOSIS — M19.031 ARTHRITIS OF WRIST, RIGHT: ICD-10-CM

## 2018-10-09 DIAGNOSIS — M19.049 CMC ARTHRITIS: ICD-10-CM

## 2018-10-09 DIAGNOSIS — M65.4 DE QUERVAIN'S TENOSYNOVITIS, RIGHT: ICD-10-CM

## 2018-10-09 DIAGNOSIS — S63.501D SPRAIN OF RIGHT WRIST, SUBSEQUENT ENCOUNTER: ICD-10-CM

## 2018-10-09 PROCEDURE — 97165 OT EVAL LOW COMPLEX 30 MIN: CPT

## 2018-10-09 PROCEDURE — G8985 CARRY GOAL STATUS: HCPCS

## 2018-10-09 PROCEDURE — G8984 CARRY CURRENT STATUS: HCPCS

## 2018-10-09 NOTE — PROGRESS NOTES
OT Evaluation     Today's date: 10/9/2018  Patient name: Ximena Yadav  : 1970  MRN: [id number]  Referring provider: Marcy Altamirano DO  Dx:   Encounter Diagnosis     ICD-10-CM    1  De Quervain's tenosynovitis, right M65 4 Ambulatory referral to Occupational Therapy   2  [facility] arthritis M19 049 Ambulatory referral to Occupational Therapy   3  Arthritis of wrist, right M19 031 Ambulatory referral to Occupational Therapy   4  Sprain of right wrist, subsequent encounter S63 501D Ambulatory referral to Occupational Therapy                  Assessment  Impairments: abnormal or restricted ROM, impaired physical strength, lacks appropriate home exercise program and pain with function  Other impairment: CMC arthritis of R thumb  Functional limitations: impaired ADLs/IADLs   Assessment details: This is a 50year old, R hand dominant female being seen for de Quervain's tenosynovitis and  CMC joint arthritis in the R hand  Patient demonstrates pain during function  She also demonstrates decreased R wrist strength due to pain  R hand AROM is WFL, but with pain  Pain is localized in the first dorsal compartment of the R thumb and CMC joint  R thumb AROM is also decreased due to pain  This patient is a good candidate for OT treatment in order to decrease pain and improve overall functional performance in ADLs/IADLs  Barriers to therapy: Co-pay  Understanding of Dx/Px/POC: good   Prognosis: good    Goals  STG (4 weeks):  1  Pt  will demonstrate AROM in the right thumb and wrist within a pain free range  2  Pt  will report average pain level of 3/10 during self-care ADLs  3  Pt  Will demonstrate a 4-/5 R wrist strength in order to grasp shampoo bottles  LTG (8 weeks):  1  Pt  Will report max pain level of 2/10 during self-care ADLs  2  Pt  Will demonstrate a 5/5 R wrist strength in order to grasp shampoo bottles     3   Patient will be MI for home management and work tasks    Plan  Patient would benefit from: skilled occupational therapy and custom splinting  Planned modality interventions: ultrasound and thermotherapy: paraffin bath  Planned therapy interventions: manual therapy, functional ROM exercises, graded activity, graded exercise, therapeutic exercise, therapeutic activities, strengthening, patient education, orthotic management and training and home exercise program  Frequency: 2x week  Duration in weeks: 8  Plan of Care beginning date: 10/9/2018  Plan of Care expiration date: 2018  Treatment plan discussed with: patient        Subjective Evaluation    History of Present Illness  Onset date: 2018  Mechanism of injury: trauma  Mechanism of injury: Patient tripped on shoes and landed on her R hand  She went to the ER a week later  MRI and CT scan confirmed de Quervain's tenosynovitis of R hand  She wears a commercial pre-martha wrist brace at night and during the day  She does not wear the splint given to her in the ER  She reports grasping objects (i e  Shampoo bottles) with R hand is very difficult, and is usually done with the L hand  Patient now presents for OT eval and treat     Recurrent probem    Quality of life: good    Pain  Current pain ratin  At best pain ratin  At worst pain ratin  Location: first dorsal compartment of the L thumb  Quality: sharp, throbbing and knife-like  Relieving factors: medications and ice  Aggravating factors: lifting  Progression: no change    Social Support  Lives with: spouse and young children    Employment status: working (currently taking work offb/c of injury)  Hand dominance: right  Life stress: none      Diagnostic Tests  MRI studies: abnormal  CT scan: abnormal  Treatments  Previous treatment: injection treatment (two cortisone injections for R thumb 2 weeks ago)  Current treatment: occupational therapy  Patient Goals  Patient goals for therapy: decreased pain, increased strength, independence with ADLs/IADLs and return to work  Patient goal: have no pain        Objective     Observations     Additional Observation Details  Blanching of the skin over the first dorsal compartment    Active Range of Motion     Right Wrist   Wrist flexion: WFL and with pain  Wrist extension: WFl and with pain  Radial deviation: WFL and with pain  Ulnar deviation: WFL and with pain    Right Thumb   Flexion     CMC: 10    MP: 30    DIP: 35  Extension     CMC: 40    MP: 0    DIP: 0  Opposition: AROM limited by pain    Additional Active Range of Motion Details  R digits WFL 2-5    Strength/Myotome Testing     Right Wrist/Hand   Wrist extension: 3  Wrist flexion: 3  Radial deviation: 3  Ulnar deviation: 3    Additional Strength Details  Blanching of the skin over the first dorsal compartment  EPB and APL 2/5  Pain noted in all planes tested for wrist strength   and pinch strength not tested due to extreme pain    Tests     Right Wrist/Hand   Positive CMC farhan and Finkelstein's             Daily Treatment Diary     Precautions: pain    Specialty Daily Treatment Diary     Manual  10/8       IASTM 5'       Kinesio taping 5'                                   Exercise Diary  10/8       HEP  PassiveWrist stretches       TGE                                                                                                                                                            Modalities 10/8

## 2018-10-11 ENCOUNTER — OFFICE VISIT (OUTPATIENT)
Dept: OCCUPATIONAL THERAPY | Facility: CLINIC | Age: 48
End: 2018-10-11
Payer: COMMERCIAL

## 2018-10-11 DIAGNOSIS — S63.501D SPRAIN OF RIGHT WRIST, SUBSEQUENT ENCOUNTER: ICD-10-CM

## 2018-10-11 DIAGNOSIS — M19.031 ARTHRITIS OF WRIST, RIGHT: ICD-10-CM

## 2018-10-11 DIAGNOSIS — M65.4 DE QUERVAIN'S TENOSYNOVITIS, RIGHT: Primary | ICD-10-CM

## 2018-10-11 DIAGNOSIS — M19.049 CMC ARTHRITIS: ICD-10-CM

## 2018-10-11 PROCEDURE — 97760 ORTHOTIC MGMT&TRAING 1ST ENC: CPT | Performed by: OCCUPATIONAL THERAPIST

## 2018-10-11 PROCEDURE — 97530 THERAPEUTIC ACTIVITIES: CPT | Performed by: OCCUPATIONAL THERAPIST

## 2018-10-11 NOTE — PROGRESS NOTES
Daily Note     Today's date: 10/11/2018  Patient name: Daniel Castaneda  : 1970  MRN: [id number]  Referring provider: Arnold Evans DO  Dx:   Encounter Diagnosis     ICD-10-CM    1  De Quervain's tenosynovitis, right M65 4    2  CMC arthritis M19 049    3  Arthritis of wrist, right M19 031    4  Sprain of right wrist, subsequent encounter S63 700D                   Subjective:   "can you check my splint?"      Objective: See treatment diary below  Manual  10/8 10l11      IASTM 5' 5;'      Kinesio taping 5' 5'      splint  Comfy cool adjusted                          Exercise Diary  10/9 10/11      HEP  PassiveWrist stretches       TGE  HEP      ud/rd  5xs /gentle      Thumb prom  Gentle 10xs                                                                                                                                          Modalities 10/8 10/11      parafin  5'                        Removed plastic insert from comfy cool splint  Cut a slit near web space b/c she said that the pressure was painful            Assessment: Tolerated treatment well  Patient would benefit from continued OT  Pain decreased from 6/10 to 4/10 following session  Patient anticpates pain and tenses up  Advised to relax multiple times  Patient has muscle spasm at distal area of thenar eminence  Applied k tape around volar and dorsal aspect of thumb in addition to dorsum of thumb/forearm and wrist          Plan: Continue per plan of care

## 2018-10-16 ENCOUNTER — OFFICE VISIT (OUTPATIENT)
Dept: OCCUPATIONAL THERAPY | Facility: CLINIC | Age: 48
End: 2018-10-16
Payer: COMMERCIAL

## 2018-10-16 DIAGNOSIS — M19.031 ARTHRITIS OF WRIST, RIGHT: ICD-10-CM

## 2018-10-16 DIAGNOSIS — M65.4 DE QUERVAIN'S TENOSYNOVITIS, RIGHT: Primary | ICD-10-CM

## 2018-10-16 DIAGNOSIS — S63.501D SPRAIN OF RIGHT WRIST, SUBSEQUENT ENCOUNTER: ICD-10-CM

## 2018-10-16 DIAGNOSIS — M19.049 CMC ARTHRITIS: ICD-10-CM

## 2018-10-16 PROCEDURE — 97530 THERAPEUTIC ACTIVITIES: CPT | Performed by: OCCUPATIONAL THERAPIST

## 2018-10-16 NOTE — PROGRESS NOTES
Daily Note     Today's date: 10/16/2018  Patient name: Peng Loyd  : 1970  MRN: [id number]  Referring provider: Fernando Waters DO  Dx:   Encounter Diagnosis     ICD-10-CM    1  De Quervain's tenosynovitis, right M65 4    2  CMC arthritis M19 049    3  Arthritis of wrist, right M19 031    4  Sprain of right wrist, subsequent encounter S63 104D                   Subjective:  " The least amount of pain is a 5 and the most is about an 8/10  Right now it is a 6 to a 6 5/10"        Objective: See treatment diary below  Manual  10/8 10l11 10/16     IASTM 5' 5;' 5'     Kinesio taping 5' 5' 3'     splint  Comfy cool adjusted                          Exercise Diary  10/9 10/11 10/16     HEP  PassiveWrist stretches       TGE  HEP      ud/rd  5xs /gentle      Thumb prom  Gentle 10xs Gentle 10xs     U/R D   10x  HEP     Thumb w/ pen alternating sides    10xs HEP/  Within toleration     Finger to thumb opposition    5xs within toleration                                                                                                                 Modalities 10/8 10/11 10/16     parafin  5' 5'                                     Assessment: Tolerated treatment well  Patient would benefit from continued OT Patient thought the K tape was helpful  She noted that she was more cautious with lifting heavy items  Patient felt the splint adjustment was helpful  She noted that she was able to wear it for 2 to1/2 hours  Patient noted to avoid HEP's that cause pain such as finger to thumb opposition  Instructed to decrease reps and perform slowly  Plan: Continue per plan of care

## 2018-10-17 DIAGNOSIS — M19.031 ARTHRITIS OF RIGHT WRIST: ICD-10-CM

## 2018-10-17 RX ORDER — MELOXICAM 15 MG/1
TABLET ORAL
Qty: 30 TABLET | Refills: 1 | OUTPATIENT
Start: 2018-10-17

## 2018-10-18 ENCOUNTER — OFFICE VISIT (OUTPATIENT)
Dept: OCCUPATIONAL THERAPY | Facility: CLINIC | Age: 48
End: 2018-10-18
Payer: COMMERCIAL

## 2018-10-18 DIAGNOSIS — M19.049 CMC ARTHRITIS: ICD-10-CM

## 2018-10-18 DIAGNOSIS — M19.031 ARTHRITIS OF WRIST, RIGHT: ICD-10-CM

## 2018-10-18 DIAGNOSIS — M65.4 DE QUERVAIN'S TENOSYNOVITIS, RIGHT: Primary | ICD-10-CM

## 2018-10-18 DIAGNOSIS — S63.501D SPRAIN OF RIGHT WRIST, SUBSEQUENT ENCOUNTER: ICD-10-CM

## 2018-10-18 PROCEDURE — 97530 THERAPEUTIC ACTIVITIES: CPT

## 2018-10-18 NOTE — PROGRESS NOTES
Daily Note     Today's date: 10/18/2018  Patient name: Kimberly Lutz  : 1970  MRN: [id number]  Referring provider: Danay Adams DO  Dx:   Encounter Diagnosis     ICD-10-CM    1  De Quervain's tenosynovitis, right M65 4    2  CMC arthritis M19 049    3  Arthritis of wrist, right M19 031    4  Sprain of right wrist, subsequent encounter S64 467D                   Subjective:  " It's sore today  I've been using my hand more "  NPR=8/10      Objective: See treatment diary below  Manual  10/8 10l11 10/16 10/18    IASTM 5' 5;' 5' 8'    Kinesio taping 5' 5' 3' 2'    splint  Comfy cool adjusted                          Exercise Diary  10/9 10/11 10/16 10/18    HEP  PassiveWrist stretches       TGE  HEP      ud/rd  5xs /gentle      Thumb prom  Gentle 10xs Gentle 10xs Gentle 10x    U/R D   10x  HEP Gentle  10x    Thumb w/ pen alternating sides    10xs HEP/  Within toleration     Finger to thumb opposition    5xs within toleration 10x    Thumb pal abd    10x    Wrist flex/ext    10x                                                                                                Modalities 10/8 10/11 10/16 10/18    parafin  5' 5' 5'                                    Assessment: Tolerated treatment well  Patient reports increased pain today secondary to increased use of Right hand with splint off  She reports some pain relief after tx session  NPR is 7/10 after tx  Patient would benefit from continued OT        Plan: Continue per plan of care

## 2018-10-23 ENCOUNTER — OFFICE VISIT (OUTPATIENT)
Dept: OCCUPATIONAL THERAPY | Facility: CLINIC | Age: 48
End: 2018-10-23
Payer: COMMERCIAL

## 2018-10-23 DIAGNOSIS — M19.031 ARTHRITIS OF WRIST, RIGHT: ICD-10-CM

## 2018-10-23 DIAGNOSIS — M65.4 DE QUERVAIN'S TENOSYNOVITIS, RIGHT: Primary | ICD-10-CM

## 2018-10-23 DIAGNOSIS — M19.049 CMC ARTHRITIS: ICD-10-CM

## 2018-10-23 DIAGNOSIS — S63.501D SPRAIN OF RIGHT WRIST, SUBSEQUENT ENCOUNTER: ICD-10-CM

## 2018-10-23 PROCEDURE — 97140 MANUAL THERAPY 1/> REGIONS: CPT | Performed by: OCCUPATIONAL THERAPIST

## 2018-10-23 PROCEDURE — 97530 THERAPEUTIC ACTIVITIES: CPT | Performed by: OCCUPATIONAL THERAPIST

## 2018-10-23 NOTE — PROGRESS NOTES
Daily Note     Today's date: 10/23/2018  Patient name: Mainor Rodriguez  : 1970  MRN: [id number]  Referring provider: Sherry Rodriguez DO  Dx:   Encounter Diagnosis     ICD-10-CM    1  De Quervain's tenosynovitis, right M65 4    2  CMC arthritis M19 049    3  Arthritis of wrist, right M19 031    4  Sprain of right wrist, subsequent encounter S63 805D                   Subjective:  " I tried to squeeze my shampoo bottle and I ended up dropping it  I get that pain and I can't hold anything "  Pt reports using compensatory strategies to be independent in her ADL's and IADL's "      Objective: See treatment diary below  Manual  10/8 10l11 10/16 10/18 10/22   IASTM 5' 5;' 5' 8' 4'   Kinesio taping 5' 5' 3' 2' 2'   splint  Comfy cool adjusted      Edema mgt     8'               Exercise Diary  10/9 10/11 10/16 10/18 10/22   HEP  PassiveWrist stretches       TGE  HEP      ud/rd  5xs /gentle      Thumb prom  Gentle 10xs Gentle 10xs Gentle 10x Gentle 10x   U/R D   10x  HEP Gentle  10x Gentle 10x   Thumb w/ pen alternating sides    10xs HEP/  Within toleration     Finger to thumb opposition    5xs within toleration 10x 5x   Thumb pal abd    10x 15'   Wrist flex/ext    10x 12xs                                                                                               Modalities 10/8 10/11 10/16 10/18 10/22   parafin  5' 5' 5'                                   Assessment: Tolerated treatment well  Patient would benefit from continued OT      Plan: Continue per plan of care

## 2018-10-25 ENCOUNTER — OFFICE VISIT (OUTPATIENT)
Dept: OCCUPATIONAL THERAPY | Facility: CLINIC | Age: 48
End: 2018-10-25
Payer: COMMERCIAL

## 2018-10-25 DIAGNOSIS — M19.031 ARTHRITIS OF WRIST, RIGHT: ICD-10-CM

## 2018-10-25 DIAGNOSIS — S63.501D SPRAIN OF RIGHT WRIST, SUBSEQUENT ENCOUNTER: ICD-10-CM

## 2018-10-25 DIAGNOSIS — M65.4 DE QUERVAIN'S TENOSYNOVITIS, RIGHT: Primary | ICD-10-CM

## 2018-10-25 DIAGNOSIS — M19.049 CMC ARTHRITIS: ICD-10-CM

## 2018-10-25 PROCEDURE — 97110 THERAPEUTIC EXERCISES: CPT

## 2018-10-25 PROCEDURE — 97140 MANUAL THERAPY 1/> REGIONS: CPT

## 2018-10-25 PROCEDURE — 97018 PARAFFIN BATH THERAPY: CPT

## 2018-10-25 NOTE — PROGRESS NOTES
Daily Note     Today's date: 10/25/2018  Patient name: Hui Grewal  : 1970  MRN: [id number]  Referring provider: Merced Jamison DO  Dx:   Encounter Diagnosis     ICD-10-CM    1  De Quervain's tenosynovitis, right M65 4    2  CMC arthritis M19 049    3  Arthritis of wrist, right M19 031    4  Sprain of right wrist, subsequent encounter S63 743D                   Subjective:  "I'm really sore today  My pain is a 7/10 "      Objective: See treatment diary below  Manual  10/8 10l11 10/16 10/18 10/22 10/25   IASTM 5' 5;' 5' 8' 4' 8'   Kinesio taping 5' 5' 3' 2' 2' 2'   splint  Comfy cool adjusted       Edema mgt     8'    TPR - extensors      5'       Exercise Diary  10/9 10/11 10/16 10/18 10/22 10/25   HEP  PassiveWrist stretches        TGE  HEP       ud/rd  5xs /gentle       Thumb prom  Gentle 10xs Gentle 10xs Gentle 10x Gentle 10x Gentle 10x   U/R D   10x  HEP Gentle  10x Gentle 10x Gentle 10x   Thumb w/ pen alternating sides    10xs HEP/  Within toleration      Finger to thumb opposition    5xs within toleration 10x 5x 10x   Thumb pal abd    10x 15' 15x   Wrist flex/ext    10x 12xs 15x   Grooved pegboard      As tolerable; able to complete 1/2 board                                                                                                 Modalities 10/8 10/11 10/16 10/18 10/22 10/25   parafin  5' 5' 5'  10'                                    Assessment: Tolerated treatment fair  Patient c/o increased pain today possibly due to increased activity & lifting grandchildren yesterday  Noted trigger points over EPL/Abductor/Extensor bulk  Reviewed HEP and emphasized importance of completing stretching exercises  Patient would benefit from continued OT      Plan: Continue per plan of care

## 2018-10-29 ENCOUNTER — OFFICE VISIT (OUTPATIENT)
Dept: OBGYN CLINIC | Facility: CLINIC | Age: 48
End: 2018-10-29
Payer: COMMERCIAL

## 2018-10-29 VITALS
WEIGHT: 152 LBS | SYSTOLIC BLOOD PRESSURE: 127 MMHG | HEART RATE: 76 BPM | DIASTOLIC BLOOD PRESSURE: 85 MMHG | BODY MASS INDEX: 27.97 KG/M2 | HEIGHT: 62 IN

## 2018-10-29 DIAGNOSIS — S63.501D SPRAIN OF RIGHT WRIST, SUBSEQUENT ENCOUNTER: ICD-10-CM

## 2018-10-29 DIAGNOSIS — M19.049 CMC ARTHRITIS: ICD-10-CM

## 2018-10-29 DIAGNOSIS — M94.241: Primary | ICD-10-CM

## 2018-10-29 PROCEDURE — 99213 OFFICE O/P EST LOW 20 MIN: CPT | Performed by: FAMILY MEDICINE

## 2018-10-29 NOTE — PROGRESS NOTES
Assessment/Plan:  Assessment/Plan   Diagnoses and all orders for this visit:    Chondromalacia of joint of hand, right  -     Ambulatory referral to Orthopedic Surgery; Future    Sprain of right wrist, subsequent encounter  -     Ambulatory referral to Orthopedic Surgery; Future    CMC arthritis  -     Ambulatory referral to Orthopedic Surgery; Future      42-year-old right-hand-dominant female with persistent right wrist pain following fall injury at home on 06/25/2018  Discussed with patient physical exam, impression, and plan  Physical exam is still noted for tenderness upon palpitation of the radial aspect of the wrist, 1st dorsal compartment, dorsal aspect of the thumb, and CMC joint  She also has significant pain with Finkelstein's maneuver  Since she has not improved with current regimen of having had corticosteroid injection to 1st dorsal compartment, 1st and 2nd CMC joints, and wearing CMC brace  I will refer her to orthopedic hand surgeon for further evaluation and recommendation of treatment options  Subjective:   Patient ID: Colleen Dudley is a 50 y o  female  Chief Complaint   Patient presents with    Right Wrist - Pain, Follow-up       42-year-old right-hand-dominant female following up for right wrist pain of onset after fall injury at home on 06/25/2018  She was last seen 4 weeks ago at which point she was prescribed with [facility] brace and referred to occupational therapy for [facility] arthritis, de Quervain's tenosynovitis, and wrist sprain  She has been wearing brace, taking meloxicam, and doing occupational therapy as directed  She still has pain described as localized to the wrist and base of the thumb, constant, achy, sometimes sharp and radiates distally along the tip of the thumb, and worse with wrist and hand use  She has not had any new injury since last visit  Wrist Pain   Associated symptoms include arthralgias and joint swelling   Pertinent negatives include no numbness or weakness  Exacerbated by: Wrist and hand use  She has tried rest, NSAIDs and immobilization (Corticosteroid injection, occupational therapy) for the symptoms  The treatment provided mild relief  Review of Systems   Musculoskeletal: Positive for arthralgias and joint swelling  Neurological: Negative for weakness and numbness  Objective:  Vitals:    10/29/18 0939   BP: 127/85   Pulse: 76   Weight: 68 9 kg (152 lb)   Height: 5' 2" (1 575 m)     Right Hand Exam     Muscle Strength   Wrist Extension: 4+/5   Wrist Flexion: 4+/5     Tests   Finkelstein: positive          Observations     Right Wrist/Hand   Negative for deformity  Tenderness     Right Wrist/Hand   Tenderness in the first dorsal compartment, carpometacarpal joint and scaphoid (Mild)  Additional Tenderness Details  Right  -dorsal radial aspect of the radius  -dorsal aspect of radiocarpal joint  -dorsal aspect of thumb    Active Range of Motion     Right Wrist   Wrist flexion: 30 degrees with pain  Wrist extension: 60 degrees with pain    Additional Active Range of Motion Details  Right hand  -limited opposition of thumb    Strength/Myotome Testing     Right Wrist/Hand   Wrist extension: 4+  Wrist flexion: 4+    Tests     Right Wrist/Hand   Positive CMC grind and Finkelstein's  Physical Exam   Constitutional: She is oriented to person, place, and time  She appears well-developed  No distress  HENT:   Head: Normocephalic  Eyes: Conjunctivae are normal    Neck: No tracheal deviation present  Cardiovascular: Normal rate  Pulmonary/Chest: Effort normal  No respiratory distress  Abdominal: She exhibits no distension  Musculoskeletal:        Right hand: She exhibits no deformity  Neurological: She is alert and oriented to person, place, and time  Skin: Skin is warm and dry  Psychiatric: She has a normal mood and affect  Her behavior is normal    Nursing note and vitals reviewed

## 2018-10-29 NOTE — LETTER
October 29, 2018     Patient: Colleen Dudley   YOB: 1970   Date of Visit: 10/29/2018       To Whom it May Concern:    Colleen Dudley is under my professional care  She was seen in my office on 10/29/2018  She is unable to return to work at this time  She is being referred to orthopedic hand surgeon  If you have any questions or concerns, please don't hesitate to call           Sincerely,          [employer], DO        CC: No Recipients

## 2018-10-30 ENCOUNTER — OFFICE VISIT (OUTPATIENT)
Dept: OCCUPATIONAL THERAPY | Facility: CLINIC | Age: 48
End: 2018-10-30
Payer: COMMERCIAL

## 2018-10-30 DIAGNOSIS — S63.501D SPRAIN OF RIGHT WRIST, SUBSEQUENT ENCOUNTER: ICD-10-CM

## 2018-10-30 DIAGNOSIS — M65.4 DE QUERVAIN'S TENOSYNOVITIS, RIGHT: Primary | ICD-10-CM

## 2018-10-30 DIAGNOSIS — M19.031 ARTHRITIS OF WRIST, RIGHT: ICD-10-CM

## 2018-10-30 DIAGNOSIS — M19.049 CMC ARTHRITIS: ICD-10-CM

## 2018-10-30 PROCEDURE — 97140 MANUAL THERAPY 1/> REGIONS: CPT | Performed by: OCCUPATIONAL THERAPIST

## 2018-10-30 NOTE — PROGRESS NOTES
Daily Note     Today's date: 10/30/2018  Patient name: Hui Grewal  : 1970  MRN: [id number]  Referring provider: Merced Jamison DO  Dx:   Encounter Diagnosis     ICD-10-CM    1  De Quervain's tenosynovitis, right M65 4    2  CMC arthritis M19 049    3  Arthritis of wrist, right M19 031    4  Sprain of right wrist, subsequent encounter S63 018D                   Subjective:   " I was doing crafts with the kids and my thumb was aching afterwards "      Objective: See treatment diary below  Manual  10/30 10l11 10/16 10/18 10/22 10/25   IASTM 15' 5;' 5' 8' 4' 8'   Kinesio taping 3 5' 3' 2' 2' 2'   splint  Comfy cool adjusted       Edema mgt 10'    8'    TPR - extensors      5'       Exercise Diary  10/30 10/11 10/16 10/18 10/22 10/25   HEP  PassiveWrist stretches        TGE  HEP       ud/rd  5xs /gentle       Thumb prom Gentle 10x Gentle 10xs Gentle 10xs Gentle 10x Gentle 10x Gentle 10x   U/R D   10x  HEP Gentle  10x Gentle 10x Gentle 10x   Thumb w/ pen alternating sides    10xs HEP/  Within toleration      Finger to thumb opposition  1x  5xs within toleration 10x 5x 10x   Thumb pal abd    10x 15' 15x   Wrist flex/ext    10x 12xs 15x   Grooved pegboard      As tolerable; able to complete 1/2 board                                                                                                 Modalities 10/8 10/11 10/16 10/18 10/22 10/25   parafin  5' 5' 5'  10'                              Assessment: Tolerated treatment fair  Patient would benefit from continued OT  Patient notes consistent pain at a 6/10  She holds right hand in a guarded position  MD appointment yesterday and he recommended follow up with an orthopedic surgeon  She has an appointment on Wednesday, 2018      Plan: Continue per plan of care

## 2018-10-31 ENCOUNTER — OFFICE VISIT (OUTPATIENT)
Dept: OBGYN CLINIC | Facility: CLINIC | Age: 48
End: 2018-10-31
Payer: COMMERCIAL

## 2018-10-31 VITALS
WEIGHT: 154 LBS | SYSTOLIC BLOOD PRESSURE: 134 MMHG | BODY MASS INDEX: 28.34 KG/M2 | DIASTOLIC BLOOD PRESSURE: 75 MMHG | HEIGHT: 62 IN | HEART RATE: 62 BPM

## 2018-10-31 DIAGNOSIS — M94.241: ICD-10-CM

## 2018-10-31 DIAGNOSIS — M19.049 CMC ARTHRITIS: ICD-10-CM

## 2018-10-31 DIAGNOSIS — S63.501D SPRAIN OF RIGHT WRIST, SUBSEQUENT ENCOUNTER: ICD-10-CM

## 2018-10-31 PROCEDURE — 99213 OFFICE O/P EST LOW 20 MIN: CPT | Performed by: ORTHOPAEDIC SURGERY

## 2018-10-31 NOTE — PROGRESS NOTES
CHIEF COMPLAINT:  Chief Complaint   Patient presents with    Right Wrist - Pain, Swelling       SUBJECTIVE:  Ximena Yadav is a 50y o  year old RHD female who presents to the office for evaluation of right wrist pain  Patient has been seen Dr Eva Schulz since injury on 6/25/18 when she fell onto her outstretched hand  Patient received cortisone injections in July and September of 2018 with out significant relief  Patient had MRI and CT scan  Patient states that she has increased pain with motion  Patient has been wearing a comfort cool splint at all times  Pt is currently attending formal OT       PAST MEDICAL HISTORY:  Past Medical History:   Diagnosis Date    Anxiety     Depression     Disease of thyroid gland     Endometriosis        PAST SURGICAL HISTORY:  Past Surgical History:   Procedure Laterality Date    BREAST MASS EXCISION      HYSTERECTOMY         FAMILY HISTORY:  Family History   Problem Relation Age of Onset    Heart disease Mother    Melissa Nine Anuerysm Mother     Heart disease Father     Diabetes Father     No Known Problems Sister     Seizures Brother     Heart disease Brother    Vita Nissen' disease Brother        SOCIAL HISTORY:  Social History   Substance Use Topics    Smoking status: Current Every Day Smoker     Packs/day: 0 50     Types: Cigarettes    Smokeless tobacco: Never Used    Alcohol use No       MEDICATIONS:    Current Outpatient Prescriptions:     estradiol (ESTRACE) 0 5 MG tablet, Take 0 5 mg by mouth daily, Disp: , Rfl:     FLUoxetine (PROzac) 40 MG capsule, Take 40 mg by mouth daily, Disp: , Rfl:     ibuprofen (MOTRIN) 400 mg tablet, Take 400 mg by mouth every 4 (four) hours, Disp: , Rfl:     levothyroxine 75 mcg tablet, Take 88 mcg by mouth daily, Disp: , Rfl:     meloxicam (MOBIC) 15 mg tablet, Take 1 tablet (15 mg total) by mouth daily, Disp: 30 tablet, Rfl: 1    ALLERGIES:  No Known Allergies    REVIEW OF SYSTEMS:  Review of Systems   Constitutional: Negative for chills, fever and unexpected weight change  HENT: Negative for hearing loss, nosebleeds and sore throat  Eyes: Negative for pain, redness and visual disturbance  Respiratory: Negative for cough, shortness of breath and wheezing  Cardiovascular: Negative for chest pain, palpitations and leg swelling  Gastrointestinal: Negative for abdominal pain, nausea and vomiting  Endocrine: Negative for polydipsia and polyuria  Genitourinary: Negative for dysuria and hematuria  Musculoskeletal: Negative for arthralgias, joint swelling and myalgias  Skin: Negative for rash and wound  Neurological: Negative for dizziness, numbness and headaches  Psychiatric/Behavioral: Negative for decreased concentration, dysphoric mood and suicidal ideas  The patient is not nervous/anxious          VITALS:  Vitals:    10/31/18 0838   BP: 134/75   Pulse: 62       LABS:  HgA1c: No results found for: HGBA1C  BMP: No results found for: GLUCOSE, CALCIUM, NA, K, CO2, CL, BUN, CREATININE    _____________________________________________________  PHYSICAL EXAMINATION:  General: well developed and well nourished, alert, oriented times 3 and appears comfortable  Psychiatric: Normal  HEENT: Trachea Midline, No torticollis  Pulmonary: No audible wheezing or respiratory distress   Skin: No masses, erthema, lacerations, fluctation, ulcerations  Neurovascular: Sensation Intact to the Median, Ulnar, Radial Nerve, Motor Intact to the Median, Ulnar, Radial Nerve and Pulses Intact    MUSCULOSKELETAL EXAMINATION:  Right wrist   No swelling erythema or ecchymosis  No crepitus with range of motion of thumb  Tender through out radial aspect of the wrist    ___________________________________________________  STUDIES REVIEWED:  I personally reviewed the following images  and my interpretation is as follows  MRI right wrist performed 08/20/2018 shows no evidence of scaphoid fracture no erosive arthritis chondromalacia at the proximal and radial aspect of the lunate bone, increased signal intensity at the dorsal aspect of the wrist suggesting dorsal wrist sprain, cystic changes in the distal aspect of the trapezoid bone with associated arthritic changes of the 2nd carpometacarpal joint    CT scan performed 07/10/2018 shows a no fracture of the scaphoid  It shows overall gross is changes at the base of 2nd metacarpal at the volar aspect of the trapezium trapezoid joint on arthritic basis  X-rays performed 06/29/2018 show no fracture dislocation or acute osseous abnormality    PROCEDURES PERFORMED:  Procedures  No Procedures performed today    _____________________________________________________  ASSESSMENT/PLAN:    Right wrist sprain  * patient will discontinue splint due to it causing limited range of motion which could be causing increased discomfort  * patient was given a new script for occupational therapy to work aggressively on range of motion as well as work hardening  * patient was given a note to return to work on November 19, 2018        Follow Up:  Return in about 6 weeks (around 12/12/2018)        To Do Next Visit:  Re-evaluation of current issue      Scribe Attestation    I,:   Kailash Veras am acting as a scribe while in the presence of the attending physician :        I,:   Jordan Yanez MD personally performed the services described in this documentation    as scribed in my presence :

## 2018-10-31 NOTE — LETTER
October 31, 2018     Patient: Jackquline Soulier   YOB: 1970   Date of Visit: 10/31/2018       To Whom it May Concern:    Jackquline Soulier is under my professional care  She was seen in my office on 10/31/2018  She may return to work on 11/19/18   If you have any questions or concerns, please don't hesitate to call           Sincerely,          Marielena Blount MD        CC: No Recipients

## 2018-11-01 ENCOUNTER — OFFICE VISIT (OUTPATIENT)
Dept: OCCUPATIONAL THERAPY | Facility: CLINIC | Age: 48
End: 2018-11-01
Payer: COMMERCIAL

## 2018-11-01 DIAGNOSIS — M65.4 DE QUERVAIN'S TENOSYNOVITIS, RIGHT: Primary | ICD-10-CM

## 2018-11-01 DIAGNOSIS — M19.031 ARTHRITIS OF WRIST, RIGHT: ICD-10-CM

## 2018-11-01 DIAGNOSIS — S63.501D SPRAIN OF RIGHT WRIST, SUBSEQUENT ENCOUNTER: ICD-10-CM

## 2018-11-01 DIAGNOSIS — M19.049 CMC ARTHRITIS: ICD-10-CM

## 2018-11-01 PROCEDURE — 97140 MANUAL THERAPY 1/> REGIONS: CPT | Performed by: OCCUPATIONAL THERAPIST

## 2018-11-01 NOTE — PROGRESS NOTES
Daily Note     Today's date: 2018  Patient name: Hector Redman  : 1970  MRN: [id number]  Referring provider: Naz Sánchez DO  Dx:   Encounter Diagnosis     ICD-10-CM    1  De Quervain's tenosynovitis, right M65 4    2  CMC arthritis M19 049    3  Arthritis of wrist, right M19 031    4  Sprain of right wrist, subsequent encounter S63 501D        Start Time: 0950  Stop Time: 1045  Total time in clinic (min): 55 minutes    Subjective:  " The doctor said my therapy needs to be more aggressive "      Objective: See treatment diary below  Manual  10/30 11/1 10/16 10/18 10/22 10/25   IASTM 15'  5' 8' 4' 8'   Kinesio taping 3 5' 3' 2' 2' 2'   splint         Edema mgt 10' 5'   8'    TPR - extensors      5'       Exercise Diary  10/30 10/11 10/16 10/18 10/22 10/25   HEP  PassiveWrist stretches        TGE         ud/rd  5xs /gentle       Thumb prom Gentle 10x 10xs/Hep hold 3secd  Gentle 10xs Gentle 10x Gentle 10x Gentle 10x   U/R D   10x  HEP Gentle  10x Gentle 10x Gentle 10x   Thumb w/ pen alternating sides    10xs HEP/  Within toleration      Finger to thumb opposition  1x  5xs within toleration 10x 5x 10x   Thumb pal abd    10x 15' 15x   Wrist flex/ext    10x 12xs 15x   Grooved pegboard      As tolerable; able to complete 1/2 board   OT PROM  15'                                                                                            Modalities 10/8 10/11 10/16 10/18 10/22 10/25   parafin  5' 5' 5'  10'                         Assessment: Tolerated treatment fair  Patient would benefit from continued OT   She tolerated extensive PROM of thumb / wrist with pain only increasing from a 5 to 6/10  She continues to have inflammation around her first dorsal compartment  Patient reports she is returning to work on 18 per MD note  Plan: Continue per plan of care  H Plasty Text: Given the location of the defect, shape of the defect and the proximity to free margins a H-plasty was deemed most appropriate for repair.  Using a sterile surgical marker, the appropriate advancement arms of the H-plasty were drawn incorporating the defect and placing the expected incisions within the relaxed skin tension lines where possible. The area thus outlined was incised deep to adipose tissue with a #15 scalpel blade. The skin margins were undermined to an appropriate distance in all directions utilizing iris scissors.  The opposing advancement arms were then advanced into place in opposite direction and anchored with interrupted buried subcutaneous sutures.

## 2018-11-06 ENCOUNTER — OFFICE VISIT (OUTPATIENT)
Dept: OCCUPATIONAL THERAPY | Facility: CLINIC | Age: 48
End: 2018-11-06
Payer: COMMERCIAL

## 2018-11-06 DIAGNOSIS — S63.501D SPRAIN OF RIGHT WRIST, SUBSEQUENT ENCOUNTER: ICD-10-CM

## 2018-11-06 DIAGNOSIS — M65.4 DE QUERVAIN'S TENOSYNOVITIS, RIGHT: Primary | ICD-10-CM

## 2018-11-06 DIAGNOSIS — M19.049 CMC ARTHRITIS: ICD-10-CM

## 2018-11-06 DIAGNOSIS — M19.031 ARTHRITIS OF WRIST, RIGHT: ICD-10-CM

## 2018-11-06 PROCEDURE — 97530 THERAPEUTIC ACTIVITIES: CPT | Performed by: OCCUPATIONAL THERAPIST

## 2018-11-08 ENCOUNTER — OFFICE VISIT (OUTPATIENT)
Dept: OCCUPATIONAL THERAPY | Facility: CLINIC | Age: 48
End: 2018-11-08
Payer: COMMERCIAL

## 2018-11-08 DIAGNOSIS — S63.501D SPRAIN OF RIGHT WRIST, SUBSEQUENT ENCOUNTER: ICD-10-CM

## 2018-11-08 DIAGNOSIS — M19.049 CMC ARTHRITIS: ICD-10-CM

## 2018-11-08 DIAGNOSIS — M65.4 DE QUERVAIN'S TENOSYNOVITIS, RIGHT: Primary | ICD-10-CM

## 2018-11-08 DIAGNOSIS — M19.031 ARTHRITIS OF WRIST, RIGHT: ICD-10-CM

## 2018-11-08 PROCEDURE — 97530 THERAPEUTIC ACTIVITIES: CPT | Performed by: OCCUPATIONAL THERAPIST

## 2018-11-08 PROCEDURE — 97140 MANUAL THERAPY 1/> REGIONS: CPT | Performed by: OCCUPATIONAL THERAPIST

## 2018-11-08 NOTE — PROGRESS NOTES
Daily Note     Today's date: 2018  Patient name: Nathalie Peterson  : 1970  MRN: [id number]  Referring provider: Dahlia Dominguez DO  Dx:   Encounter Diagnosis     ICD-10-CM    1  De Quervain's tenosynovitis, right M65 4    2  CMC arthritis M19 049    3  Arthritis of wrist, right M19 031    4  Sprain of right wrist, subsequent encounter S63 604D                   Subjective:  Pain is a 5/10 today  But I think it's getting a little better  Objective: See treatment diary below  Manual  10/30 11/1 11/6 11/8 10/22 10/25   IASTM 15'   8' 8' 4' 8'   Kinesio taping 3 5' 5'   2' 2'   splint               Edema mgt 10' 5'    5' 8'     TPR - extensors           5'         Exercise Diary  10/30 11/6 11/8 10/18 10/22 10/25   HEP  PassiveWrist stretches             TGE               ud/rd              Thumb prom Gentle 10x x30  10xs Gentle 10x Gentle 10x Gentle 10x   U/R D     10x  HEP Gentle  10x Gentle 10x Gentle 10x   Thumb w/ pen alternating sides     x20          Finger to thumb opposition  1x  x10; hold for 2sec at [facility] 5xs within toleration 10x 5x 10x   Thumb pal abd    x10   10x 15' 15x   Wrist flex/ext       10x 12xs 15x   Grooved pegboard    1 set- full board       As tolerable; able to complete 1/2 board   OT PROM               RPB wrist f/e/ Up / down      10 x 2                                                                                                                                                Modalities 11/6 11/8 10/16 10/18 10/22 10/25   parafin  8'  5' 5' 5'   10'                                             Assessment: Tolerated treatment well  Patient would benefit from continued OT   Patient presented with slight skin irritation of K tape so OT witheld this session  Initiated strengthening this date in prep for RTW  Tolerated increases in strengthening  Plan: Continue per plan of care

## 2018-11-13 ENCOUNTER — OFFICE VISIT (OUTPATIENT)
Dept: OCCUPATIONAL THERAPY | Facility: CLINIC | Age: 48
End: 2018-11-13
Payer: COMMERCIAL

## 2018-11-13 DIAGNOSIS — M65.4 DE QUERVAIN'S TENOSYNOVITIS, RIGHT: Primary | ICD-10-CM

## 2018-11-13 DIAGNOSIS — M19.049 CMC ARTHRITIS: ICD-10-CM

## 2018-11-13 DIAGNOSIS — S63.501D SPRAIN OF RIGHT WRIST, SUBSEQUENT ENCOUNTER: ICD-10-CM

## 2018-11-13 DIAGNOSIS — M19.031 ARTHRITIS OF WRIST, RIGHT: ICD-10-CM

## 2018-11-13 PROCEDURE — G8985 CARRY GOAL STATUS: HCPCS | Performed by: OCCUPATIONAL THERAPIST

## 2018-11-13 PROCEDURE — G8986 CARRY D/C STATUS: HCPCS | Performed by: OCCUPATIONAL THERAPIST

## 2018-11-13 PROCEDURE — 97530 THERAPEUTIC ACTIVITIES: CPT | Performed by: OCCUPATIONAL THERAPIST

## 2018-11-13 NOTE — PROGRESS NOTES
OT Evaluation     Today's date: 2018  Patient name: Tate Finley  : 1970  MRN: [id number]  Referring provider: Eliz Felix DO  Dx:   Encounter Diagnosis     ICD-10-CM    1  De Quervain's tenosynovitis, right M65 4    2  CMC arthritis M19 049    3  Arthritis of wrist, right M19 031    4  Sprain of right wrist, subsequent encounter S63 332D                   Assessment  Impairments: abnormal or restricted ROM, impaired physical strength, lacks appropriate home exercise program and pain with function  Other impairment: CMC arthritis of R thumb  Functional limitations: impaired ADLs/IADLs   Assessment details: This is a 50year old, R hand dominant female being seen for de Quervain's tenosynovitis and  CMC joint arthritis in the R hand  Patient demonstrates pain during function  She also demonstrates decreased R wrist strength due to pain  R hand AROM is WFL, but with pain  Pain is localized in the first dorsal compartment of the R thumb and CMC joint  R thumb AROM is also decreased due to pain  This patient is a good candidate for OT treatment in order to decrease pain and improve overall functional performance in ADLs/IADLs  18:  Patient has persistent pain in dorsal compartment 1  She has made slight gains in AROM/strength  Her pain remains her primary issue resulting in deficits in strength and ROM  Patient requested d/c today and she plans to RTW on 18 per MD order  Barriers to therapy: Co-pay  Understanding of Dx/Px/POC: good   Prognosis: good    Goals  STG (4 weeks):  1  Pt  will demonstrate AROM in the right thumb and wrist within a pain free range  2  Pt  will report average pain level of 3/10 during self-care ADLs  3  Pt  Will demonstrate a 4-/5 R wrist strength in order to grasp shampoo bottles  LTG (8 weeks):  1  Pt  Will report max pain level of 2/10 during self-care ADLs  2  Pt  Will demonstrate a 5/5 R wrist strength in order to grasp shampoo bottles  3   Patient will be MI for home management and work tasks    Plan  Patient would benefit from: skilled occupational therapy and custom splinting  Planned modality interventions: ultrasound and thermotherapy: paraffin bath  Planned therapy interventions: manual therapy, functional ROM exercises, graded activity, graded exercise, therapeutic exercise, therapeutic activities, strengthening, patient education, orthotic management and training and home exercise program  Frequency: 2x week  Duration in weeks: 8  Plan of Care beginning date: 10/9/2018  Plan of Care expiration date: 2018  Treatment plan discussed with: patient        Subjective Evaluation    History of Present Illness  Onset date: 2018  Mechanism of injury: trauma  Mechanism of injury: Patient tripped on shoes and landed on her R hand  She went to the ER a week later  MRI and CT scan confirmed de Quervain's tenosynovitis of R hand  She wears a commercial pre-martha wrist brace at night and during the day  She does not wear the splint given to her in the ER  She reports grasping objects (i e  Shampoo bottles) with R hand is very difficult, and is usually done with the L hand  Patient now presents for OT eval and treat  18:  Patient voiced belief that she is 25-35% improved since start of OT  She notes the same pain with grabbing something and the same pressure      Recurrent probem    Quality of life: good    Pain  Current pain ratin  At best pain rating: 3  At worst pain ratin  Location: first dorsal compartment of the L thumb  Quality: sharp, throbbing and knife-like  Relieving factors: medications and ice  Aggravating factors: lifting  Progression: no change    Social Support  Lives with: spouse and young children    Employment status: working (currently taking work offb/c of injury)  Hand dominance: right  Life stress: none      Diagnostic Tests  MRI studies: abnormal  CT scan: abnormal  Treatments  Previous treatment: injection treatment (two cortisone injections for R thumb 2 weeks ago)  Current treatment: occupational therapy  Patient Goals  Patient goals for therapy: decreased pain, increased strength, independence with ADLs/IADLs and return to work  Patient goal: have no pain        Objective     Observations     Additional Observation Details  Blanching of the skin over the first dorsal compartment    Active Range of Motion     Right Wrist   Wrist flexion: WFL and with pain  Wrist extension: WFl and with pain  Radial deviation: WFL and with pain  Ulnar deviation: WFL and with pain    Right Thumb   Flexion     CMC: 10    MP: 40    DIP: 41  Extension     CMC: 50    MP: 0    DIP: 0  Opposition: AROM limited by pain    PROM wnl    Additional Active Range of Motion Details  R digits WFL 2-5    Strength/Myotome Testing     Left Wrist/Hand      (2nd hand position)     Trial 1: 45    Trial 2: 40    Trial 3: 35    Thumb Strength  Key/Lateral Pinch     Trail 1: 13  Tip/Two-Point Pinch     Trial 1: 5  Palmar/Three-Point Pinch     Trial 1: 10    Right Wrist/Hand   Wrist extension: 3  Wrist flexion: 3  Radial deviation: 3  Ulnar deviation: 3     (2nd hand position)     Trial 1: 10    Trial 2: 10    Trial 3: 11    Thumb Strength   Key/Lateral Pinch     Trial 1: 7  Tip/Two-Point Pinch     Trial 1: 0 5  Palmar/Three-Point Pinch     Trial 1: 0 5    Additional Strength Details  Blanching of the skin over the first dorsal compartment  EPB and APL 2/5  Pain noted in all planes tested for wrist strength   and pinch strength not tested due to extreme pain  11/13/18:   and pinch test performed this date  Left   Pain with 5 5 - 6/10   level 1 3  Level 2  :10  Level 3:   5  Level 4"  6  Level 5: 7  R  test  Level 1: 35  Level : 44  Level 3 :  35  Level 4:  35  Level 5 15    Pain with pinch test was 6/10  Tests     Right Wrist/Hand   Positive CMC grind and Finkelstein's             Daily Treatment Diary Precautions: pain    Specialty Daily Treatment Diary     Manual  10/8       IASTM 5'       Kinesio taping 5'                                   Exercise Diary  10/8       HEP  PassiveWrist stretches       TGE                                                                                                                                                            Modalities 10/8

## 2018-11-15 ENCOUNTER — APPOINTMENT (OUTPATIENT)
Dept: OCCUPATIONAL THERAPY | Facility: CLINIC | Age: 48
End: 2018-11-15
Payer: COMMERCIAL

## 2018-11-20 ENCOUNTER — APPOINTMENT (OUTPATIENT)
Dept: OCCUPATIONAL THERAPY | Facility: CLINIC | Age: 48
End: 2018-11-20
Payer: COMMERCIAL

## 2018-11-27 ENCOUNTER — APPOINTMENT (OUTPATIENT)
Dept: OCCUPATIONAL THERAPY | Facility: CLINIC | Age: 48
End: 2018-11-27
Payer: COMMERCIAL

## 2018-11-29 ENCOUNTER — APPOINTMENT (OUTPATIENT)
Dept: OCCUPATIONAL THERAPY | Facility: CLINIC | Age: 48
End: 2018-11-29
Payer: COMMERCIAL

## 2019-06-24 ENCOUNTER — OFFICE VISIT (OUTPATIENT)
Dept: OBGYN CLINIC | Facility: MEDICAL CENTER | Age: 49
End: 2019-06-24
Payer: COMMERCIAL

## 2019-06-24 VITALS
DIASTOLIC BLOOD PRESSURE: 74 MMHG | HEIGHT: 62 IN | SYSTOLIC BLOOD PRESSURE: 106 MMHG | BODY MASS INDEX: 28.89 KG/M2 | WEIGHT: 157 LBS | HEART RATE: 75 BPM

## 2019-06-24 DIAGNOSIS — M18.11 PRIMARY OSTEOARTHRITIS OF FIRST CARPOMETACARPAL JOINT OF RIGHT HAND: Primary | ICD-10-CM

## 2019-06-24 PROCEDURE — 99213 OFFICE O/P EST LOW 20 MIN: CPT | Performed by: ORTHOPAEDIC SURGERY

## 2019-06-24 PROCEDURE — 20600 DRAIN/INJ JOINT/BURSA W/O US: CPT | Performed by: ORTHOPAEDIC SURGERY

## 2019-06-24 RX ORDER — TRIAMCINOLONE ACETONIDE 40 MG/ML
20 INJECTION, SUSPENSION INTRA-ARTICULAR; INTRAMUSCULAR
Status: COMPLETED | OUTPATIENT
Start: 2019-06-24 | End: 2019-06-24

## 2019-06-24 RX ORDER — LIDOCAINE HYDROCHLORIDE 10 MG/ML
2.5 INJECTION, SOLUTION INFILTRATION; PERINEURAL
Status: COMPLETED | OUTPATIENT
Start: 2019-06-24 | End: 2019-06-24

## 2019-06-24 RX ADMIN — LIDOCAINE HYDROCHLORIDE 2.5 ML: 10 INJECTION, SOLUTION INFILTRATION; PERINEURAL at 13:34

## 2019-06-24 RX ADMIN — Medication 0.05 MEQ: at 13:34

## 2019-06-24 RX ADMIN — TRIAMCINOLONE ACETONIDE 20 MG: 40 INJECTION, SUSPENSION INTRA-ARTICULAR; INTRAMUSCULAR at 13:34

## 2019-07-11 ENCOUNTER — OFFICE VISIT (OUTPATIENT)
Dept: OBGYN CLINIC | Facility: CLINIC | Age: 49
End: 2019-07-11
Payer: COMMERCIAL

## 2019-07-11 VITALS
DIASTOLIC BLOOD PRESSURE: 82 MMHG | SYSTOLIC BLOOD PRESSURE: 127 MMHG | HEART RATE: 63 BPM | HEIGHT: 62 IN | BODY MASS INDEX: 28.89 KG/M2 | WEIGHT: 157 LBS

## 2019-07-11 DIAGNOSIS — M18.11 PRIMARY OSTEOARTHRITIS OF FIRST CARPOMETACARPAL JOINT OF RIGHT HAND: Primary | ICD-10-CM

## 2019-07-11 PROCEDURE — 99213 OFFICE O/P EST LOW 20 MIN: CPT | Performed by: ORTHOPAEDIC SURGERY

## 2019-07-11 NOTE — PROGRESS NOTES
CHIEF COMPLAINT:  Chief Complaint   Patient presents with    Right Wrist - Follow-up       SUBJECTIVE:  Ximena Negron is a 50y o  year old RHD female who presents for follow-up regarding right CMC cortisone injection  Patient states that she is doing well after the cortisone injection  She will feel some tightness upper forearm with certain motions and repetitive motions but she says it is tolerable with Tylenol and ibuprofen  She denies any numbness or tingling        PAST MEDICAL HISTORY:  Past Medical History:   Diagnosis Date    Anxiety     Depression     Disease of thyroid gland     Endometriosis        PAST SURGICAL HISTORY:  Past Surgical History:   Procedure Laterality Date    BREAST MASS EXCISION      HYSTERECTOMY         FAMILY HISTORY:  Family History   Problem Relation Age of Onset    Heart disease Mother    [facility] Anuerysm Mother     Heart disease Father     Diabetes Father     No Known Problems Sister     Seizures Brother     Heart disease Brother    Loretta Marck' disease Brother        SOCIAL HISTORY:  Social History     Tobacco Use    Smoking status: Current Every Day Smoker     Packs/day: 0 50     Types: Cigarettes    Smokeless tobacco: Never Used   Substance Use Topics    Alcohol use: No    Drug use: No       MEDICATIONS:    Current Outpatient Medications:     estradiol (ESTRACE) 0 5 MG tablet, Take 0 5 mg by mouth daily, Disp: , Rfl:     FLUoxetine (PROzac) 40 MG capsule, Take 40 mg by mouth daily, Disp: , Rfl:     ibuprofen (MOTRIN) 400 mg tablet, Take 400 mg by mouth every 4 (four) hours, Disp: , Rfl:     levothyroxine 75 mcg tablet, Take 88 mcg by mouth daily, Disp: , Rfl:     ALLERGIES:  No Known Allergies    REVIEW OF SYSTEMS:  Review of Systems  ROS:   General: no fever, no chills  HEENT:  No loss of hearing or eyesight problems  Eyes:  No red eyes  Respiratory:  No coughing, shortness of breath or wheezing  Cardiovascular:  No chest pain, no palpitations  GI:  Abdomen soft nontender, denies nausea  Endocrine:  No muscle weakness, no frequent urination, no excessive thirst  Urinary:  No dysuria, no incontinence  Musculoskeletal: see HPI and PE  SKIN:  No skin rash, no dry skin  Neurological:  No headaches, no confusion  Psychiatric:  No suicide thoughts, no anxiety, no depression  Review of all other systems is negative    VITALS:  Vitals:    07/11/19 1454   BP: 127/82   Pulse: 63       LABS:  HgA1c: No results found for: HGBA1C  BMP: No results found for: GLUCOSE, CALCIUM, NA, K, CO2, CL, BUN, CREATININE    _____________________________________________________  PHYSICAL EXAMINATION:  General: well developed and well nourished, alert, oriented times 3 and appears comfortable  Psychiatric: Normal  HEENT: Trachea Midline, No torticollis  Pulmonary: No audible wheezing or respiratory distress   Skin: No masses, erythema, lacerations, fluctation, ulcerations  Neurovascular: Sensation Intact to the Median, Ulnar, Radial Nerve, Motor Intact to the Median, Ulnar, Radial Nerve and Pulses Intact    MUSCULOSKELETAL EXAMINATION:  right CMC Exam:  No adduction contracture  No hyperextension deformity of MCP joint  Negative localized tenderness over radial and dorsal aspect of thumb (CMC joint)  Grind test is Negative for pain and Positive for crepitus  No triggering or tenderness over the A1 pulley  No pain with Finkelsteins maneuver             ___________________________________________________  STUDIES REVIEWED:  No studies reviewed         PROCEDURES PERFORMED:  Procedures  No Procedures performed today    _____________________________________________________  ASSESSMENT/PLAN:    Right thumb CMC arthritis, resolved  -patient is doing well after cortisone injection  -she was advised that we can give her another cortisone injection in about 3 months if needed  -she was advised to continue work on stretching and using heat and anti-inflammatories as needed for pain  -she will follow up with us as needed        Follow Up:  Return if symptoms worsen or fail to improve  To Do Next Visit:  Re-evaluation of current issue    General Discussions:  Aia 16 Arthritis: The anatomy and physiology of carpometacarpal joint arthritis was discussed with the patient today in the office  Deterioration of the articular cartilage eventually leads to hypermobility at the thumb Aia 16 joint, resulting in joint subluxation, osteophyte formation, cystic changes within the trapezium and base of the first metacarpal, as well as subchondral sclerosis  Eventually, pain, limited mobility, and compensatory hyperextension at the metacarpophalangeal joint may develop  While normal activity and usage of the thumb joint may provide a painful experience to the patient, this typically does not result in damage to the thumb or hand  Treatment options include resting thumb spica splints to decreased joint edema, pain, and inflammation  Therapy exercises to strengthen the thenar musculature may relieve pain, but do not alter the overall continued development of osteoarthritis  Oral medications, topical medications, corticosteroid injections may decrease pain and increase overall function  Eventually, approximately 5% of patients may require surgical intervention                                                                                                                                                                      Scribe Attestation    I,:   Sarah Leal PA-C am acting as a scribe while in the presence of the attending physician :        I,:   Martha Moore MD personally performed the services described in this documentation    as scribed in my presence :

## 2019-12-04 NOTE — PROGRESS NOTES
Daily Note     Today's date: 2018  Patient name: Trevor Young  : 1970  MRN: [id number]  Referring provider: Jm Parrish DO  Dx:   Encounter Diagnosis     ICD-10-CM    1  De Quervain's tenosynovitis, right M65 4    2  CMC arthritis M19 049    3  Arthritis of wrist, right M19 031    4  Sprain of right wrist, subsequent encounter S63 765D                   Subjective: Pt reports 5/10 pain  She used the hot pad and did her stretches  She feels better today  Objective: See treatment diary below    Objective: See treatment diary below  Manual  10/30 11/1 11/6 10/18 10/22 10/25   IASTM 15'   8' 8' 4' 8'   Kinesio taping 3 5' 5'  2' 2' 2'   splint               Edema mgt 10' 5'     8'     TPR - extensors           5'         Exercise Diary  10/30 11/6 10/16 10/18 10/22 10/25   HEP  PassiveWrist stretches             TGE               ud/rd              Thumb prom Gentle 10x x30 Gentle 10xs Gentle 10x Gentle 10x Gentle 10x   U/R D     10x  HEP Gentle  10x Gentle 10x Gentle 10x   Thumb w/ pen alternating sides     x20 10xs HEP/  Within toleration         Finger to thumb opposition  1x  x10; hold for 2sec at [facility] 5xs within toleration 10x 5x 10x   Thumb pal abd    x10   10x 15' 15x   Wrist flex/ext       10x 12xs 15x   Grooved pegboard    1 set- full board       As tolerable; able to complete 1/2 board   OT PROM                                                                                                                                                                    Modalities 11/6 10/11 10/16 10/18 10/22 10/25   parafin  8'  5' 5' 5'   10'                                               Assessment: Tolerated treatment well  Patient would benefit from continued OT  Patient reports pain following last session and notes it dissipated over 24 hours  She reports that she has been doing PROM and pain is at a 5/10 this date which is less then last visit    Patient is slated for RTW 11/19 /18      Plan: Continue per plan of care  Progress treatment as tolerated  Statement Selected Statement Selected

## 2021-04-15 PROCEDURE — 88304 TISSUE EXAM BY PATHOLOGIST: CPT | Performed by: PATHOLOGY

## 2021-04-16 ENCOUNTER — LAB REQUISITION (OUTPATIENT)
Dept: LAB | Facility: HOSPITAL | Age: 51
End: 2021-04-16
Payer: COMMERCIAL

## 2021-04-16 DIAGNOSIS — M71.349 OTHER BURSAL CYST, UNSPECIFIED HAND: ICD-10-CM

## 2024-05-08 ENCOUNTER — OFFICE VISIT (OUTPATIENT)
Age: 54
End: 2024-05-08
Payer: COMMERCIAL

## 2024-05-08 ENCOUNTER — PREP FOR PROCEDURE (OUTPATIENT)
Age: 54
End: 2024-05-08

## 2024-05-08 VITALS
OXYGEN SATURATION: 97 % | HEIGHT: 61 IN | WEIGHT: 146 LBS | BODY MASS INDEX: 27.56 KG/M2 | HEART RATE: 68 BPM | DIASTOLIC BLOOD PRESSURE: 89 MMHG | SYSTOLIC BLOOD PRESSURE: 146 MMHG

## 2024-05-08 DIAGNOSIS — R19.5 POSITIVE COLORECTAL CANCER SCREENING USING COLOGUARD TEST: Primary | ICD-10-CM

## 2024-05-08 DIAGNOSIS — Z72.0 TOBACCO ABUSE: ICD-10-CM

## 2024-05-08 DIAGNOSIS — E03.9 ACQUIRED HYPOTHYROIDISM: ICD-10-CM

## 2024-05-08 DIAGNOSIS — Z12.11 ENCOUNTER FOR SCREENING COLONOSCOPY: Primary | ICD-10-CM

## 2024-05-08 DIAGNOSIS — E66.3 OVERWEIGHT (BMI 25.0-29.9): ICD-10-CM

## 2024-05-08 PROCEDURE — 99202 OFFICE O/P NEW SF 15 MIN: CPT | Performed by: COLON & RECTAL SURGERY

## 2024-05-08 RX ORDER — SODIUM PICOSULFATE, MAGNESIUM OXIDE, AND ANHYDROUS CITRIC ACID 12; 3.5; 1 G/175ML; G/175ML; MG/175ML
LIQUID ORAL
Qty: 350 ML | Refills: 0 | Status: SHIPPED | OUTPATIENT
Start: 2024-05-08

## 2024-05-08 RX ORDER — ROPINIROLE 0.25 MG/1
TABLET, FILM COATED ORAL
COMMUNITY
Start: 2024-01-30

## 2024-05-08 NOTE — PROGRESS NOTES
"Assessment/Plan:  Patient is a 53-year-old woman with history of positive Cologuard referred by her PCP for diagnostic colonoscopy       Diagnoses and all orders for this visit:    Positive colorectal cancer screening using Cologuard test  -     Colonoscopy; Future    Acquired hypothyroidism    Tobacco abuse    Other orders  -     rOPINIRole (REQUIP) 0.25 mg tablet; One tab at bedtime  -     Diet NPO; Sips with meds; Standing  -     Void on call to OR; Standing  -     Insert peripheral IV; Standing          Subjective:      Patient ID: Cookie Schwartz is a 53 y.o. female.    HPI  Patient is a 53-year-old woman with history of positive Cologuard referred by her PCP for diagnostic colonoscopy  The following portions of the patient's history were reviewed and updated as appropriate: allergies, current medications, past family history, past medical history, past social history, past surgical history, and problem list.    Review of Systems   Constitutional:  Negative for chills and fever.   HENT:  Negative for ear pain and sore throat.    Eyes:  Negative for pain and visual disturbance.   Respiratory:  Negative for cough and shortness of breath.    Cardiovascular:  Negative for chest pain and palpitations.   Gastrointestinal:  Negative for abdominal pain and vomiting.   Genitourinary:  Negative for dysuria and hematuria.   Musculoskeletal:  Negative for arthralgias and back pain.   Skin:  Negative for color change and rash.   Neurological:  Negative for seizures and syncope.   All other systems reviewed and are negative.        Objective:      /89   Pulse 68   Ht 5' 1\" (1.549 m)   Wt 66.2 kg (146 lb)   SpO2 97%   BMI 27.59 kg/m²          Physical Exam  Vitals and nursing note reviewed.   Constitutional:       Appearance: Normal appearance.   HENT:      Head: Normocephalic and atraumatic.   Eyes:      Conjunctiva/sclera: Conjunctivae normal.   Cardiovascular:      Rate and Rhythm: Normal rate and regular rhythm. "      Heart sounds: Normal heart sounds.   Pulmonary:      Effort: Pulmonary effort is normal.      Breath sounds: Normal breath sounds.   Abdominal:      General: Bowel sounds are normal.      Palpations: Abdomen is soft.   Musculoskeletal:      Cervical back: Neck supple.   Skin:     General: Skin is warm and dry.   Neurological:      Mental Status: She is alert and oriented to person, place, and time.   Psychiatric:         Mood and Affect: Mood normal.         Behavior: Behavior normal.         Thought Content: Thought content normal.         Judgment: Judgment normal.

## 2024-05-08 NOTE — PATIENT INSTRUCTIONS
Scheduled date of colonoscopy (as of today): 6/22/24  Physician performing colonoscopy: Tian  Location of colonoscopy: [place]  Bowel prep reviewed with patient: Clenpiq  Instructions reviewed with patient by: Shalonda CONTRERAS  Clearances:

## 2024-06-17 ENCOUNTER — HOSPITAL ENCOUNTER (OUTPATIENT)
Dept: GASTROENTEROLOGY | Facility: HOSPITAL | Age: 54
Setting detail: OUTPATIENT SURGERY
Discharge: HOME | End: 2024-06-17
Attending: COLON & RECTAL SURGERY
Payer: COMMERCIAL

## 2024-06-17 ENCOUNTER — ANESTHESIA EVENT (OUTPATIENT)
Dept: GASTROENTEROLOGY | Facility: HOSPITAL | Age: 54
End: 2024-06-17

## 2024-06-17 ENCOUNTER — ANESTHESIA (OUTPATIENT)
Dept: GASTROENTEROLOGY | Facility: HOSPITAL | Age: 54
End: 2024-06-17

## 2024-06-17 VITALS
HEART RATE: 56 BPM | HEIGHT: 61 IN | DIASTOLIC BLOOD PRESSURE: 75 MMHG | OXYGEN SATURATION: 98 % | SYSTOLIC BLOOD PRESSURE: 141 MMHG | BODY MASS INDEX: 27.06 KG/M2 | RESPIRATION RATE: 14 BRPM | WEIGHT: 143.3 LBS | TEMPERATURE: 99.4 F

## 2024-06-17 DIAGNOSIS — R19.5 POSITIVE COLORECTAL CANCER SCREENING USING COLOGUARD TEST: ICD-10-CM

## 2024-06-17 PROBLEM — E03.9 HYPOTHYROIDISM: Status: ACTIVE | Noted: 2024-06-17

## 2024-06-17 PROBLEM — F17.200 SMOKING: Status: ACTIVE | Noted: 2024-06-17

## 2024-06-17 PROCEDURE — G0121 COLON CA SCRN NOT HI RSK IND: HCPCS | Performed by: COLON & RECTAL SURGERY

## 2024-06-17 RX ORDER — SODIUM CHLORIDE, SODIUM LACTATE, POTASSIUM CHLORIDE, CALCIUM CHLORIDE 600; 310; 30; 20 MG/100ML; MG/100ML; MG/100ML; MG/100ML
125 INJECTION, SOLUTION INTRAVENOUS CONTINUOUS
Status: DISCONTINUED | OUTPATIENT
Start: 2024-06-17 | End: 2024-06-21 | Stop reason: HOSPADM

## 2024-06-17 RX ORDER — ONDANSETRON 2 MG/ML
4 INJECTION INTRAMUSCULAR; INTRAVENOUS ONCE AS NEEDED
Status: CANCELLED | OUTPATIENT
Start: 2024-06-17

## 2024-06-17 RX ORDER — PROPOFOL 10 MG/ML
INJECTION, EMULSION INTRAVENOUS AS NEEDED
Status: DISCONTINUED | OUTPATIENT
Start: 2024-06-17 | End: 2024-06-17

## 2024-06-17 RX ADMIN — PROPOFOL 120 MG: 10 INJECTION, EMULSION INTRAVENOUS at 07:23

## 2024-06-17 RX ADMIN — PROPOFOL 50 MG: 10 INJECTION, EMULSION INTRAVENOUS at 07:32

## 2024-06-17 RX ADMIN — PROPOFOL 50 MG: 10 INJECTION, EMULSION INTRAVENOUS at 07:27

## 2024-06-17 RX ADMIN — SODIUM CHLORIDE, SODIUM LACTATE, POTASSIUM CHLORIDE, AND CALCIUM CHLORIDE: .6; .31; .03; .02 INJECTION, SOLUTION INTRAVENOUS at 07:18

## 2024-06-17 RX ADMIN — PROPOFOL 30 MG: 10 INJECTION, EMULSION INTRAVENOUS at 07:24

## 2024-06-17 NOTE — INTERVAL H&P NOTE
H&P reviewed. After examining the patient I find no changes in the patients condition since the H&P had been written.    Vitals:    06/17/24 0647   BP: (!) 171/86   Pulse: 63   Resp: 20   Temp: 98.4 °F (36.9 °C)   SpO2: 100%

## 2024-06-17 NOTE — ANESTHESIA PREPROCEDURE EVALUATION
Procedure:  COLONOSCOPY    Relevant Problems   ANESTHESIA (within normal limits)      CARDIO (within normal limits)      ENDO   (+) Hypothyroidism      PULMONARY   (+) Smoking        Physical Exam    Airway    Mallampati score: II  TM Distance: >3 FB  Neck ROM: full     Dental   No notable dental hx     Cardiovascular  Rate: normal    Pulmonary   Breath sounds clear to auscultation    Other Findings  post-pubertal.      Anesthesia Plan  ASA Score- 2     Anesthesia Type- IV sedation with anesthesia with ASA Monitors.         Additional Monitors:     Airway Plan:            Plan Factors-Exercise tolerance (METS): >4 METS.    Chart reviewed.   Existing labs reviewed. Patient summary reviewed.    Patient is a current smoker.  Patient smoked on day of surgery.    Obstructive sleep apnea risk education given perioperatively.        Induction-     Postoperative Plan-     Perioperative Resuscitation Plan - Level 1 - Full Code.       Informed Consent- Anesthetic plan and risks discussed with patient.  I personally reviewed this patient with the CRNA. Discussed and agreed on the Anesthesia Plan with the CRNA..

## 2024-06-17 NOTE — ANESTHESIA POSTPROCEDURE EVALUATION
Post-Op Assessment Note    CV Status:  Stable    Pain management: adequate       Mental Status:  Alert and awake   Hydration Status:  Euvolemic   PONV Controlled:  Controlled   Airway Patency:  Patent  Two or more mitigation strategies used for obstructive sleep apnea   Post Op Vitals Reviewed: Yes    No anethesia notable event occurred.    Staff: Anesthesiologist, CRNA               /66 (06/17/24 0739)    Temp 99.4 °F (37.4 °C) (06/17/24 0739)    Pulse 77 (06/17/24 0739)   Resp 21 (06/17/24 0739)    SpO2 100 % (06/17/24 0739)

## 2024-06-17 NOTE — H&P
"History and Physical -  [facility]  Cookie Schwartz 53 y.o. female MRN: [id number]                  HPI: Cookie Schwartz is a 53 y.o. year old female who presents for diagnostic colonoscopy positive Cologuard      REVIEW OF SYSTEMS: Per the HPI, and otherwise unremarkable.    Historical Information   Past Medical History:   Diagnosis Date    Anxiety     Depression     Disease of thyroid gland     Endometriosis      Past Surgical History:   Procedure Laterality Date    BREAST MASS EXCISION      HYSTERECTOMY  2010    Complete - no cancer     Social History   Social History     Substance and Sexual Activity   Alcohol Use Yes    Alcohol/week: 15.0 standard drinks of alcohol    Types: 15 Standard drinks or equivalent per week     Social History     Substance and Sexual Activity   Drug Use Yes    Types: Marijuana     Social History     Tobacco Use   Smoking Status Every Day    Current packs/day: 0.50    Types: Cigarettes   Smokeless Tobacco Never     Family History   Problem Relation Age of Onset    Heart disease Mother     Anuerysm Mother     Heart disease Father     Diabetes Father     No Known Problems Sister     Asthma Brother     Seizures Brother     Heart disease Brother     Graves' disease Brother     Thyroid disease Brother        Meds/Allergies     Not in a hospital admission.    No Known Allergies    Objective     Blood pressure (!) 171/86, pulse 63, temperature 98.4 °F (36.9 °C), temperature source Temporal, resp. rate 20, height 5' 1\" (1.549 m), weight 65 kg (143 lb 4.8 oz), SpO2 100%.      PHYSICAL EXAM    Gen: NAD  CV: RRR  CHEST: Clear  ABD: soft, NT/ND  EXT: no edema  Neuro: AAO      ASSESSMENT/PLAN:  This is a 53 y.o. year old female here for diagnostic colonoscopy positive Cologuard    PLAN:   Procedure: Colonoscopy          "